# Patient Record
Sex: MALE | Race: WHITE | NOT HISPANIC OR LATINO | Employment: OTHER | URBAN - METROPOLITAN AREA
[De-identification: names, ages, dates, MRNs, and addresses within clinical notes are randomized per-mention and may not be internally consistent; named-entity substitution may affect disease eponyms.]

---

## 2023-03-29 LAB
CALCIDIOL (25 OH VITAMIN D3) (NG/ML) IN SER/PLAS: 43 NG/ML
CHOLESTEROL (MG/DL) IN SER/PLAS: 152 MG/DL (ref 0–199)
CHOLESTEROL IN HDL (MG/DL) IN SER/PLAS: 47.8 MG/DL
CHOLESTEROL/HDL RATIO: 3.2
ESTIMATED AVERAGE GLUCOSE FOR HBA1C: 143 MG/DL
HEMOGLOBIN A1C/HEMOGLOBIN TOTAL IN BLOOD: 6.6 %
LDL: 77 MG/DL (ref 0–99)
TRIGLYCERIDE (MG/DL) IN SER/PLAS: 138 MG/DL (ref 0–149)
VLDL: 28 MG/DL (ref 0–40)

## 2023-06-22 ENCOUNTER — OFFICE VISIT (OUTPATIENT)
Dept: PRIMARY CARE | Facility: CLINIC | Age: 73
End: 2023-06-22
Payer: MEDICARE

## 2023-06-22 VITALS
RESPIRATION RATE: 18 BRPM | SYSTOLIC BLOOD PRESSURE: 114 MMHG | BODY MASS INDEX: 34.45 KG/M2 | HEART RATE: 96 BPM | OXYGEN SATURATION: 94 % | DIASTOLIC BLOOD PRESSURE: 79 MMHG | TEMPERATURE: 97 F | WEIGHT: 232.6 LBS | HEIGHT: 69 IN

## 2023-06-22 DIAGNOSIS — R53.83 FATIGUE, UNSPECIFIED TYPE: ICD-10-CM

## 2023-06-22 DIAGNOSIS — Z00.00 HEALTHCARE MAINTENANCE: Primary | ICD-10-CM

## 2023-06-22 DIAGNOSIS — R10.84 GENERALIZED ABDOMINAL PAIN: ICD-10-CM

## 2023-06-22 DIAGNOSIS — M25.561 ACUTE PAIN OF RIGHT KNEE: ICD-10-CM

## 2023-06-22 PROCEDURE — 1170F FXNL STATUS ASSESSED: CPT | Performed by: INTERNAL MEDICINE

## 2023-06-22 PROCEDURE — 1160F RVW MEDS BY RX/DR IN RCRD: CPT | Performed by: INTERNAL MEDICINE

## 2023-06-22 PROCEDURE — G0439 PPPS, SUBSEQ VISIT: HCPCS | Performed by: INTERNAL MEDICINE

## 2023-06-22 PROCEDURE — 1036F TOBACCO NON-USER: CPT | Performed by: INTERNAL MEDICINE

## 2023-06-22 PROCEDURE — 1159F MED LIST DOCD IN RCRD: CPT | Performed by: INTERNAL MEDICINE

## 2023-06-22 RX ORDER — PEN NEEDLE, DIABETIC 31 GX5/16"
NEEDLE, DISPOSABLE MISCELLANEOUS
COMMUNITY
Start: 2023-03-30 | End: 2024-05-02

## 2023-06-22 RX ORDER — ROSUVASTATIN CALCIUM 20 MG/1
20 TABLET, COATED ORAL DAILY
COMMUNITY
Start: 2021-08-31 | End: 2024-03-07

## 2023-06-22 RX ORDER — CLONAZEPAM 2 MG/1
2 TABLET ORAL 2 TIMES DAILY PRN
COMMUNITY

## 2023-06-22 RX ORDER — HYDROGEN PEROXIDE 3 %
20 SOLUTION, NON-ORAL MISCELLANEOUS
COMMUNITY
Start: 2021-08-31

## 2023-06-22 RX ORDER — SUMATRIPTAN 5 MG/1
1 SPRAY NASAL EVERY 2 HOUR PRN
COMMUNITY
Start: 2021-08-31

## 2023-06-22 RX ORDER — METFORMIN HYDROCHLORIDE 1000 MG/1
1000 TABLET ORAL 2 TIMES DAILY
COMMUNITY
Start: 2021-08-31 | End: 2024-03-07

## 2023-06-22 RX ORDER — ALBUTEROL SULFATE 90 UG/1
2 AEROSOL, METERED RESPIRATORY (INHALATION) EVERY 6 HOURS PRN
COMMUNITY
Start: 2021-08-31

## 2023-06-22 RX ORDER — AMITRIPTYLINE HYDROCHLORIDE 25 MG/1
50 TABLET, FILM COATED ORAL NIGHTLY
COMMUNITY

## 2023-06-22 RX ORDER — ACETAMINOPHEN 500 MG
500 TABLET ORAL EVERY 4 HOURS PRN
COMMUNITY
End: 2023-11-15 | Stop reason: ALTCHOICE

## 2023-06-22 RX ORDER — ASPIRIN 325 MG
50000 TABLET, DELAYED RELEASE (ENTERIC COATED) ORAL
COMMUNITY

## 2023-06-22 RX ORDER — PRAMIPEXOLE DIHYDROCHLORIDE 0.25 MG/1
0.25 TABLET ORAL 3 TIMES DAILY
COMMUNITY

## 2023-06-22 RX ORDER — INSULIN GLARGINE 100 [IU]/ML
INJECTION, SOLUTION SUBCUTANEOUS
COMMUNITY
Start: 2022-12-29 | End: 2023-11-15 | Stop reason: ALTCHOICE

## 2023-06-22 RX ORDER — GABAPENTIN 300 MG/1
300 CAPSULE ORAL 2 TIMES DAILY
COMMUNITY
Start: 2021-08-13 | End: 2023-11-15 | Stop reason: ALTCHOICE

## 2023-06-22 RX ORDER — DAPAGLIFLOZIN 10 MG/1
10 TABLET, FILM COATED ORAL
COMMUNITY
End: 2023-11-15 | Stop reason: SDUPTHER

## 2023-06-22 RX ORDER — SOLIFENACIN SUCCINATE 10 MG/1
10 TABLET, FILM COATED ORAL DAILY
COMMUNITY
Start: 2023-04-12 | End: 2023-11-08 | Stop reason: WASHOUT

## 2023-06-22 RX ORDER — AZELASTINE 1 MG/ML
1 SPRAY, METERED NASAL 2 TIMES DAILY
COMMUNITY
Start: 2020-07-28

## 2023-06-22 RX ORDER — SILDENAFIL CITRATE 20 MG/1
20 TABLET ORAL
COMMUNITY
Start: 2019-04-15 | End: 2023-11-15 | Stop reason: ALTCHOICE

## 2023-06-22 RX ORDER — ONDANSETRON 4 MG/1
4 TABLET, ORALLY DISINTEGRATING ORAL EVERY 8 HOURS PRN
COMMUNITY
Start: 2022-05-16 | End: 2024-03-25 | Stop reason: ALTCHOICE

## 2023-06-22 SDOH — ECONOMIC STABILITY: INCOME INSECURITY: IN THE LAST 12 MONTHS, WAS THERE A TIME WHEN YOU WERE NOT ABLE TO PAY THE MORTGAGE OR RENT ON TIME?: NO

## 2023-06-22 SDOH — HEALTH STABILITY: PHYSICAL HEALTH: ON AVERAGE, HOW MANY DAYS PER WEEK DO YOU ENGAGE IN MODERATE TO STRENUOUS EXERCISE (LIKE A BRISK WALK)?: 5 DAYS

## 2023-06-22 SDOH — ECONOMIC STABILITY: TRANSPORTATION INSECURITY
IN THE PAST 12 MONTHS, HAS THE LACK OF TRANSPORTATION KEPT YOU FROM MEDICAL APPOINTMENTS OR FROM GETTING MEDICATIONS?: NO

## 2023-06-22 SDOH — ECONOMIC STABILITY: FOOD INSECURITY: WITHIN THE PAST 12 MONTHS, YOU WORRIED THAT YOUR FOOD WOULD RUN OUT BEFORE YOU GOT MONEY TO BUY MORE.: NEVER TRUE

## 2023-06-22 SDOH — ECONOMIC STABILITY: TRANSPORTATION INSECURITY
IN THE PAST 12 MONTHS, HAS LACK OF TRANSPORTATION KEPT YOU FROM MEETINGS, WORK, OR FROM GETTING THINGS NEEDED FOR DAILY LIVING?: NO

## 2023-06-22 SDOH — ECONOMIC STABILITY: HOUSING INSECURITY
IN THE LAST 12 MONTHS, WAS THERE A TIME WHEN YOU DID NOT HAVE A STEADY PLACE TO SLEEP OR SLEPT IN A SHELTER (INCLUDING NOW)?: NO

## 2023-06-22 SDOH — ECONOMIC STABILITY: FOOD INSECURITY: WITHIN THE PAST 12 MONTHS, THE FOOD YOU BOUGHT JUST DIDN'T LAST AND YOU DIDN'T HAVE MONEY TO GET MORE.: NEVER TRUE

## 2023-06-22 SDOH — HEALTH STABILITY: PHYSICAL HEALTH: ON AVERAGE, HOW MANY MINUTES DO YOU ENGAGE IN EXERCISE AT THIS LEVEL?: 50 MIN

## 2023-06-22 ASSESSMENT — ANXIETY QUESTIONNAIRES
IF YOU CHECKED OFF ANY PROBLEMS ON THIS QUESTIONNAIRE, HOW DIFFICULT HAVE THESE PROBLEMS MADE IT FOR YOU TO DO YOUR WORK, TAKE CARE OF THINGS AT HOME, OR GET ALONG WITH OTHER PEOPLE: NOT DIFFICULT AT ALL
1. FEELING NERVOUS, ANXIOUS, OR ON EDGE: NOT AT ALL
5. BEING SO RESTLESS THAT IT IS HARD TO SIT STILL: NOT AT ALL
2. NOT BEING ABLE TO STOP OR CONTROL WORRYING: NOT AT ALL
7. FEELING AFRAID AS IF SOMETHING AWFUL MIGHT HAPPEN: NOT AT ALL
GAD7 TOTAL SCORE: 0
3. WORRYING TOO MUCH ABOUT DIFFERENT THINGS: NOT AT ALL
4. TROUBLE RELAXING: NOT AT ALL
6. BECOMING EASILY ANNOYED OR IRRITABLE: NOT AT ALL

## 2023-06-22 ASSESSMENT — SOCIAL DETERMINANTS OF HEALTH (SDOH)
WITHIN THE LAST YEAR, HAVE YOU BEEN KICKED, HIT, SLAPPED, OR OTHERWISE PHYSICALLY HURT BY YOUR PARTNER OR EX-PARTNER?: NO
WITHIN THE LAST YEAR, HAVE TO BEEN RAPED OR FORCED TO HAVE ANY KIND OF SEXUAL ACTIVITY BY YOUR PARTNER OR EX-PARTNER?: NO
HOW HARD IS IT FOR YOU TO PAY FOR THE VERY BASICS LIKE FOOD, HOUSING, MEDICAL CARE, AND HEATING?: NOT VERY HARD
HOW OFTEN DO YOU ATTENT MEETINGS OF THE CLUB OR ORGANIZATION YOU BELONG TO?: PATIENT DECLINED
HOW OFTEN DO YOU GET TOGETHER WITH FRIENDS OR RELATIVES?: THREE TIMES A WEEK
WITHIN THE LAST YEAR, HAVE YOU BEEN HUMILIATED OR EMOTIONALLY ABUSED IN OTHER WAYS BY YOUR PARTNER OR EX-PARTNER?: NO
WITHIN THE LAST YEAR, HAVE YOU BEEN AFRAID OF YOUR PARTNER OR EX-PARTNER?: NO
DO YOU BELONG TO ANY CLUBS OR ORGANIZATIONS SUCH AS CHURCH GROUPS UNIONS, FRATERNAL OR ATHLETIC GROUPS, OR SCHOOL GROUPS?: PATIENT DECLINED
ARE YOU MARRIED, WIDOWED, DIVORCED, SEPARATED, NEVER MARRIED, OR LIVING WITH A PARTNER?: PATIENT DECLINED
IN THE PAST 12 MONTHS, HAS THE ELECTRIC, GAS, OIL, OR WATER COMPANY THREATENED TO SHUT OFF SERVICE IN YOUR HOME?: NO
HOW OFTEN DO YOU ATTEND CHURCH OR RELIGIOUS SERVICES?: MORE THAN 4 TIMES PER YEAR
IN A TYPICAL WEEK, HOW MANY TIMES DO YOU TALK ON THE PHONE WITH FAMILY, FRIENDS, OR NEIGHBORS?: THREE TIMES A WEEK

## 2023-06-22 ASSESSMENT — COLUMBIA-SUICIDE SEVERITY RATING SCALE - C-SSRS
6. HAVE YOU EVER DONE ANYTHING, STARTED TO DO ANYTHING, OR PREPARED TO DO ANYTHING TO END YOUR LIFE?: NO
2. HAVE YOU ACTUALLY HAD ANY THOUGHTS OF KILLING YOURSELF?: NO
1. IN THE PAST MONTH, HAVE YOU WISHED YOU WERE DEAD OR WISHED YOU COULD GO TO SLEEP AND NOT WAKE UP?: NO

## 2023-06-22 ASSESSMENT — ACTIVITIES OF DAILY LIVING (ADL)
JUDGMENT_ADEQUATE_SAFELY_COMPLETE_DAILY_ACTIVITIES: YES
ADEQUATE_TO_COMPLETE_ADL: YES
JUDGMENT_ADEQUATE_SAFELY_COMPLETE_DAILY_ACTIVITIES: YES
GROCERY SHOPPING: INDEPENDENT
HEARING - RIGHT EAR: FUNCTIONAL
DRESSING: INDEPENDENT
BATHING: INDEPENDENT
TAKING MEDICATION: INDEPENDENT
STIL DRIVING: YES
FEEDING YOURSELF: INDEPENDENT
USING TELEPHONE: INDEPENDENT
WALKS IN HOME: INDEPENDENT
DRESSING YOURSELF: INDEPENDENT
BATHING: INDEPENDENT
EATING: INDEPENDENT
ADEQUATE_TO_COMPLETE_ADL: YES
GROOMING: INDEPENDENT
PILL BOX USED: NO
HEARING - LEFT EAR: FUNCTIONAL
DOING HOUSEWORK: INDEPENDENT
PREPARING MEALS: INDEPENDENT
TOILETING: INDEPENDENT
USING TRANSPORTATION: INDEPENDENT
NEEDS ASSISTANCE WITH FOOD: INDEPENDENT
PATIENT'S MEMORY ADEQUATE TO SAFELY COMPLETE DAILY ACTIVITIES?: YES
TOILETING: INDEPENDENT
FEEDING: INDEPENDENT
MANAGING FINANCES: INDEPENDENT

## 2023-06-22 ASSESSMENT — ENCOUNTER SYMPTOMS
MYALGIAS: 0
DIARRHEA: 0
CHILLS: 0
SORE THROAT: 0
LOSS OF SENSATION IN FEET: 0
SINUS PRESSURE: 0
NAUSEA: 0
OCCASIONAL FEELINGS OF UNSTEADINESS: 0
DEPRESSION: 0
ACTIVITY CHANGE: 0
AGITATION: 0
WEAKNESS: 0
CHEST TIGHTNESS: 0
PALPITATIONS: 0
SHORTNESS OF BREATH: 0

## 2023-06-22 ASSESSMENT — LIFESTYLE VARIABLES
HOW MANY STANDARD DRINKS CONTAINING ALCOHOL DO YOU HAVE ON A TYPICAL DAY: 1 OR 2
SKIP TO QUESTIONS 9-10: 1
AUDIT-C TOTAL SCORE: 1
HOW OFTEN DO YOU HAVE SIX OR MORE DRINKS ON ONE OCCASION: NEVER
HOW OFTEN DO YOU HAVE A DRINK CONTAINING ALCOHOL: MONTHLY OR LESS

## 2023-06-22 ASSESSMENT — PATIENT HEALTH QUESTIONNAIRE - PHQ9
2. FEELING DOWN, DEPRESSED OR HOPELESS: NOT AT ALL
1. LITTLE INTEREST OR PLEASURE IN DOING THINGS: NOT AT ALL
SUM OF ALL RESPONSES TO PHQ9 QUESTIONS 1 & 2: 0

## 2023-06-22 ASSESSMENT — PAIN SCALES - GENERAL: PAINLEVEL: 0-NO PAIN

## 2023-06-22 NOTE — PROGRESS NOTES
"Subjective   Patient ID: Frandy Nick is a 73 y.o. male who presents for Diabetes and Medicare Annual Wellness Visit Subsequent.  He feels a little lack of energy.  Laying around a lot more.  He has had no issues with hypoglycemia.    Diabetes  Pertinent negatives for diabetes include no chest pain and no weakness.        Review of Systems   Constitutional:  Negative for activity change and chills.   HENT:  Negative for congestion, sinus pressure and sore throat.    Respiratory:  Negative for chest tightness and shortness of breath.    Cardiovascular:  Negative for chest pain and palpitations.   Gastrointestinal:  Negative for diarrhea and nausea.   Musculoskeletal:  Negative for myalgias.   Neurological:  Negative for weakness.   Psychiatric/Behavioral:  Negative for agitation and behavioral problems.        Objective   /79 (BP Location: Left arm, Patient Position: Sitting, BP Cuff Size: Large adult)   Pulse 96   Temp 36.1 °C (97 °F) (Temporal)   Resp 18   Ht 1.753 m (5' 9\")   Wt 106 kg (232 lb 9.6 oz)   SpO2 94%   BMI 34.35 kg/m²     Physical Exam  Constitutional:       Appearance: Normal appearance.   HENT:      Head: Normocephalic and atraumatic.      Nose: Nose normal.      Mouth/Throat:      Mouth: Mucous membranes are moist.   Eyes:      Extraocular Movements: Extraocular movements intact.      Pupils: Pupils are equal, round, and reactive to light.   Cardiovascular:      Rate and Rhythm: Normal rate and regular rhythm.   Pulmonary:      Effort: No respiratory distress.      Breath sounds: No wheezing.   Abdominal:      General: Bowel sounds are normal.      Palpations: Abdomen is soft.   Neurological:      General: No focal deficit present.         Assessment/Plan   Problem List Items Addressed This Visit       Healthcare maintenance - Primary    Fatigue    Relevant Orders    Comprehensive Metabolic Panel    CBC    Generalized abdominal pain    Relevant Orders    Amylase    Lipase    Acute pain " of right knee    Relevant Orders    Referral to Orthopaedic Surgery   This low-grade abdominal pain seems to be getting better but we will check a amylase and lipase to make sure is not related to a recurrent pancreatitis.  We will encourage the patient to start walking as a way to improve his energy.

## 2023-06-23 LAB
ALANINE AMINOTRANSFERASE (SGPT) (U/L) IN SER/PLAS: 14 U/L (ref 10–52)
ALBUMIN (G/DL) IN SER/PLAS: 4.4 G/DL (ref 3.4–5)
ALKALINE PHOSPHATASE (U/L) IN SER/PLAS: 71 U/L (ref 33–136)
AMYLASE (U/L) IN SER/PLAS: 102 U/L (ref 29–103)
ANION GAP IN SER/PLAS: 12 MMOL/L (ref 10–20)
ASPARTATE AMINOTRANSFERASE (SGOT) (U/L) IN SER/PLAS: 13 U/L (ref 9–39)
BILIRUBIN TOTAL (MG/DL) IN SER/PLAS: 0.4 MG/DL (ref 0–1.2)
CALCIUM (MG/DL) IN SER/PLAS: 10.1 MG/DL (ref 8.6–10.6)
CARBON DIOXIDE, TOTAL (MMOL/L) IN SER/PLAS: 29 MMOL/L (ref 21–32)
CHLORIDE (MMOL/L) IN SER/PLAS: 102 MMOL/L (ref 98–107)
CREATININE (MG/DL) IN SER/PLAS: 0.96 MG/DL (ref 0.5–1.3)
ERYTHROCYTE DISTRIBUTION WIDTH (RATIO) BY AUTOMATED COUNT: 15.3 % (ref 11.5–14.5)
ERYTHROCYTE MEAN CORPUSCULAR HEMOGLOBIN CONCENTRATION (G/DL) BY AUTOMATED: 30.8 G/DL (ref 32–36)
ERYTHROCYTE MEAN CORPUSCULAR VOLUME (FL) BY AUTOMATED COUNT: 96 FL (ref 80–100)
ERYTHROCYTES (10*6/UL) IN BLOOD BY AUTOMATED COUNT: 5.21 X10E12/L (ref 4.5–5.9)
GFR MALE: 84 ML/MIN/1.73M2
GLUCOSE (MG/DL) IN SER/PLAS: 96 MG/DL (ref 74–99)
HEMATOCRIT (%) IN BLOOD BY AUTOMATED COUNT: 50 % (ref 41–52)
HEMOGLOBIN (G/DL) IN BLOOD: 15.4 G/DL (ref 13.5–17.5)
LEUKOCYTES (10*3/UL) IN BLOOD BY AUTOMATED COUNT: 8.1 X10E9/L (ref 4.4–11.3)
LIPASE (U/L) IN SER/PLAS: 34 U/L (ref 9–82)
NRBC (PER 100 WBCS) BY AUTOMATED COUNT: 0 /100 WBC (ref 0–0)
PLATELETS (10*3/UL) IN BLOOD AUTOMATED COUNT: 196 X10E9/L (ref 150–450)
POTASSIUM (MMOL/L) IN SER/PLAS: 4.4 MMOL/L (ref 3.5–5.3)
PROTEIN TOTAL: 7 G/DL (ref 6.4–8.2)
SODIUM (MMOL/L) IN SER/PLAS: 139 MMOL/L (ref 136–145)
UREA NITROGEN (MG/DL) IN SER/PLAS: 18 MG/DL (ref 6–23)

## 2023-06-25 PROBLEM — R10.84 GENERALIZED ABDOMINAL PAIN: Status: ACTIVE | Noted: 2023-06-25

## 2023-06-25 PROBLEM — M25.561 ACUTE PAIN OF RIGHT KNEE: Status: ACTIVE | Noted: 2023-06-25

## 2023-06-25 PROBLEM — R53.83 FATIGUE: Status: ACTIVE | Noted: 2023-06-25

## 2023-06-25 PROBLEM — Z00.00 HEALTHCARE MAINTENANCE: Status: ACTIVE | Noted: 2023-06-25

## 2023-06-25 ASSESSMENT — PATIENT HEALTH QUESTIONNAIRE - PHQ9
SUM OF ALL RESPONSES TO PHQ9 QUESTIONS 1 AND 2: 0
1. LITTLE INTEREST OR PLEASURE IN DOING THINGS: NOT AT ALL
2. FEELING DOWN, DEPRESSED OR HOPELESS: NOT AT ALL

## 2023-06-25 ASSESSMENT — ACTIVITIES OF DAILY LIVING (ADL)
BATHING: INDEPENDENT
MANAGING_FINANCES: INDEPENDENT
TAKING_MEDICATION: INDEPENDENT
DRESSING: INDEPENDENT
DOING_HOUSEWORK: INDEPENDENT
GROCERY_SHOPPING: INDEPENDENT

## 2023-06-26 ENCOUNTER — APPOINTMENT (OUTPATIENT)
Dept: PRIMARY CARE | Facility: CLINIC | Age: 73
End: 2023-06-26
Payer: MEDICARE

## 2023-11-06 PROBLEM — E55.9 VITAMIN D DEFICIENCY: Status: ACTIVE | Noted: 2020-03-18

## 2023-11-06 PROBLEM — E11.69 ERECTILE DYSFUNCTION ASSOCIATED WITH TYPE 2 DIABETES MELLITUS (MULTI): Status: ACTIVE | Noted: 2018-10-22

## 2023-11-06 PROBLEM — H25.13 NUCLEAR SENILE CATARACT OF BOTH EYES: Status: ACTIVE | Noted: 2018-01-11

## 2023-11-06 PROBLEM — E11.69 DIABETES MELLITUS TYPE 2 IN OBESE: Status: ACTIVE | Noted: 2023-11-06

## 2023-11-06 PROBLEM — H52.03 HYPERMETROPIA OF BOTH EYES: Status: ACTIVE | Noted: 2023-11-06

## 2023-11-06 PROBLEM — N40.1 BPH WITH OBSTRUCTION/LOWER URINARY TRACT SYMPTOMS: Status: ACTIVE | Noted: 2023-11-06

## 2023-11-06 PROBLEM — H43.822 VITREOMACULAR TRACTION SYNDROME OF LEFT EYE: Status: ACTIVE | Noted: 2023-11-06

## 2023-11-06 PROBLEM — N40.0 BENIGN PROSTATIC HYPERPLASIA WITHOUT LOWER URINARY TRACT SYMPTOMS: Status: ACTIVE | Noted: 2020-06-26

## 2023-11-06 PROBLEM — R39.15 URGENCY OF URINATION: Status: ACTIVE | Noted: 2023-11-06

## 2023-11-06 PROBLEM — L73.9 FOLLICULAR DISORDER, UNSPECIFIED: Status: ACTIVE | Noted: 2023-04-19

## 2023-11-06 PROBLEM — G47.33 OBSTRUCTIVE SLEEP APNEA SYNDROME: Status: ACTIVE | Noted: 2017-11-15

## 2023-11-06 PROBLEM — E78.1 HYPERTRIGLYCERIDEMIA: Status: ACTIVE | Noted: 2018-04-02

## 2023-11-06 PROBLEM — H52.203 ASTIGMATISM, BILATERAL: Status: ACTIVE | Noted: 2023-11-06

## 2023-11-06 PROBLEM — N52.1 ERECTILE DYSFUNCTION ASSOCIATED WITH TYPE 2 DIABETES MELLITUS (MULTI): Status: ACTIVE | Noted: 2018-10-22

## 2023-11-06 PROBLEM — F32.4 MAJOR DEPRESSIVE DISORDER WITH SINGLE EPISODE, IN PARTIAL REMISSION (CMS-HCC): Status: ACTIVE | Noted: 2018-04-02

## 2023-11-06 PROBLEM — N13.8 BPH WITH OBSTRUCTION/LOWER URINARY TRACT SYMPTOMS: Status: ACTIVE | Noted: 2023-11-06

## 2023-11-06 PROBLEM — H25.13 NUCLEAR SCLEROSIS OF BOTH EYES: Status: ACTIVE | Noted: 2023-11-06

## 2023-11-06 PROBLEM — R94.4 ABNORMAL RENAL FUNCTION TEST: Status: ACTIVE | Noted: 2023-11-06

## 2023-11-06 PROBLEM — H52.4 BILATERAL PRESBYOPIA: Status: ACTIVE | Noted: 2023-11-06

## 2023-11-06 PROBLEM — K21.9 GASTROESOPHAGEAL REFLUX DISEASE: Status: ACTIVE | Noted: 2018-07-27

## 2023-11-06 PROBLEM — F41.9 ANXIETY: Status: ACTIVE | Noted: 2023-11-06

## 2023-11-06 PROBLEM — R73.01 IFG (IMPAIRED FASTING GLUCOSE): Status: ACTIVE | Noted: 2023-11-06

## 2023-11-06 PROBLEM — N50.89 SCROTAL MASS: Status: ACTIVE | Noted: 2018-10-22

## 2023-11-06 PROBLEM — K25.9 GASTRIC ULCER: Status: ACTIVE | Noted: 2023-11-06

## 2023-11-06 PROBLEM — E66.9 DIABETES MELLITUS TYPE 2 IN OBESE: Status: ACTIVE | Noted: 2023-11-06

## 2023-11-06 PROBLEM — H25.013 CORTICAL AGE-RELATED CATARACT OF BOTH EYES: Status: ACTIVE | Noted: 2023-11-06

## 2023-11-06 PROBLEM — H35.40 PERIPHERAL RETICULAR DEGENERATION OF BOTH EYES: Status: ACTIVE | Noted: 2023-11-06

## 2023-11-06 PROBLEM — E78.00 HYPERCHOLESTEREMIA: Status: ACTIVE | Noted: 2023-11-06

## 2023-11-06 PROBLEM — G43.909 MIGRAINE: Status: ACTIVE | Noted: 2023-11-06

## 2023-11-06 PROBLEM — E11.65 TYPE 2 DIABETES MELLITUS WITH HYPERGLYCEMIA (MULTI): Status: ACTIVE | Noted: 2023-11-06

## 2023-11-06 PROBLEM — K63.5 POLYP OF COLON: Status: ACTIVE | Noted: 2020-06-26

## 2023-11-06 PROBLEM — H43.813 PVD (POSTERIOR VITREOUS DETACHMENT), BOTH EYES: Status: ACTIVE | Noted: 2023-11-06

## 2023-11-06 RX ORDER — ERGOCALCIFEROL 1.25 MG/1
1 CAPSULE ORAL WEEKLY
COMMUNITY
End: 2023-11-15 | Stop reason: ALTCHOICE

## 2023-11-06 RX ORDER — TADALAFIL 20 MG/1
TABLET ORAL
COMMUNITY
Start: 2023-08-03

## 2023-11-07 ENCOUNTER — OFFICE VISIT (OUTPATIENT)
Dept: ORTHOPEDIC SURGERY | Facility: CLINIC | Age: 73
End: 2023-11-07
Payer: MEDICARE

## 2023-11-07 ENCOUNTER — ANCILLARY PROCEDURE (OUTPATIENT)
Dept: RADIOLOGY | Facility: CLINIC | Age: 73
End: 2023-11-07
Payer: MEDICARE

## 2023-11-07 DIAGNOSIS — M17.11 PRIMARY OSTEOARTHRITIS OF RIGHT KNEE: Primary | ICD-10-CM

## 2023-11-07 DIAGNOSIS — M25.561 CHRONIC PAIN OF RIGHT KNEE: ICD-10-CM

## 2023-11-07 DIAGNOSIS — G89.29 CHRONIC PAIN OF RIGHT KNEE: ICD-10-CM

## 2023-11-07 PROCEDURE — 1126F AMNT PAIN NOTED NONE PRSNT: CPT | Performed by: STUDENT IN AN ORGANIZED HEALTH CARE EDUCATION/TRAINING PROGRAM

## 2023-11-07 PROCEDURE — 99204 OFFICE O/P NEW MOD 45 MIN: CPT | Performed by: STUDENT IN AN ORGANIZED HEALTH CARE EDUCATION/TRAINING PROGRAM

## 2023-11-07 PROCEDURE — 99214 OFFICE O/P EST MOD 30 MIN: CPT | Performed by: STUDENT IN AN ORGANIZED HEALTH CARE EDUCATION/TRAINING PROGRAM

## 2023-11-07 PROCEDURE — 73564 X-RAY EXAM KNEE 4 OR MORE: CPT | Mod: RIGHT SIDE | Performed by: RADIOLOGY

## 2023-11-07 PROCEDURE — 1159F MED LIST DOCD IN RCRD: CPT | Performed by: STUDENT IN AN ORGANIZED HEALTH CARE EDUCATION/TRAINING PROGRAM

## 2023-11-07 PROCEDURE — 73564 X-RAY EXAM KNEE 4 OR MORE: CPT | Mod: RT

## 2023-11-07 NOTE — PROGRESS NOTES
Frandy Nick is a pleasant 73 y.o. year-old male who is seen today for evaluation of right knee pain. The pain has been present for many years. 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 The onset of pain was not associated with a traumatic injury.  Frandy Nick states that the pain is located in the anterolateral aspect of the knee and was worse in the summer but is not present today.  The patient denies any history of inflammatory arthritis.  The pain is aggravated by prolonged activity and alleviated by rest, ibuprofen.     The patient has tried activity modification and over the counter medications without sufficient relief of symptoms. The does not patient require the use of an assistive device. During this time the patient has not had a significant reduction in their quality of life and activities of daily living.  The pain does not wake the patient from sleep. The patient does report feeling unstable on uneven surfaces.  Frandy Nick denies any hip or groin pain.  Frandy Nick denies any numbness or tingling of the right lower extremity.     He has had steroid injections for his shoulder before but not for his knee. He is interested but would like to defer until closer to his scheduled [place] vacation in January.     The patient denies any fever, chills, chest pain, shortness of breath or difficulty breathing.  Patient denies any numbness, tingling, or radicular symptoms.  Adult patient history sheet was filled out by the patient today in clinic and will be scanned into the EMR.  I personally reviewed this form which will be scanned into the EMR.  This includes Past Medical History, Past Surgical History, Medications, Allergies, Social History, Family History and 12 point review of systems.    General: Well-appearing male in no  acute distress.  Awake, alert and oriented.  Pleasant and cooperative.  Respiratory: Non-labored breathing  Mood: Euthymic   Gait: minimally antalgic  Assistive Device: None     Affected Right Knee  Limb Alignment: neutral  ROM: 0-120  Stable to varus and valgus stress at full extension and 30 degrees of flexion  Skin: Intact, no abrasions or draining sinuses  Effusion: None  Quad Strength: 5/5  Hamstring Strength: 5/5  Patella Crepitus: None  Patella Grind: absent  Tenderness: present with anteromedial palpation  Sensation: Intact to light touch distally  Motor function: Able to fire TA, EHL, G/S  Pulses: Palpable DP pulse  Negative Thessally test    Unaffected Left Knee  Skin: Intact  ROM: 0-120  Effusion: None  No tenderness to palpation on exam    Imaging:   AP / lateral/ mid-flexion/sunrise views: Independent review of right knee x-rays was performed. The findings were reviewed with the patient. There are moderate degenerative changes of the right knee with neutral  limb alignment. There is joint space narrowing, subchondral sclerosis, and osteophyte formation. No evidence of fracture, AVN, dislocation, osteomyelitis.      Assessment:  Moderate-severe right knee osteoarthritis    Plan:  Given his pain has improved since summer and does not significantly preclude him from ADLs, he is not a candidate for surgery at this time. We discussed other conservative measures including corticosteroid injections. He would prefer to defer until closer to his vacation in January. Of note, given his history of left knee meniscus issue and his primary complaint of anterolateral pain, it is possible some of his pain may be related to his meniscus. However, he is tender to palpation anteromedially with negative provocative testing.    Return for right knee CSI in mid-December.  If this does not provide long term pain relief, we may consider obtaining a knee MRI to evaluate for other structural pathology.  If any new  changes/concerns prior, he can contact us.    The patient was seen and examined with my resident Dr. Gutierrez who assisted with documentation.  I independently interviewed the patient to obtain a history and performed physical examination.  I formulated the plan of care.    *This note was created using voice recognition software and was not corrected for typographical or grammatical errors.*

## 2023-11-08 ENCOUNTER — OFFICE VISIT (OUTPATIENT)
Dept: DERMATOLOGY | Facility: CLINIC | Age: 73
End: 2023-11-08
Payer: MEDICARE

## 2023-11-08 DIAGNOSIS — L73.9 FOLLICULITIS: ICD-10-CM

## 2023-11-08 DIAGNOSIS — B07.9 VIRAL WARTS, UNSPECIFIED TYPE: ICD-10-CM

## 2023-11-08 DIAGNOSIS — L57.0 ACTINIC KERATOSIS: Primary | ICD-10-CM

## 2023-11-08 PROCEDURE — 17110 DESTRUCTION B9 LES UP TO 14: CPT | Performed by: STUDENT IN AN ORGANIZED HEALTH CARE EDUCATION/TRAINING PROGRAM

## 2023-11-08 PROCEDURE — 3078F DIAST BP <80 MM HG: CPT | Performed by: DERMATOLOGY

## 2023-11-08 PROCEDURE — 17000 DESTRUCT PREMALG LESION: CPT | Performed by: STUDENT IN AN ORGANIZED HEALTH CARE EDUCATION/TRAINING PROGRAM

## 2023-11-08 PROCEDURE — 1126F AMNT PAIN NOTED NONE PRSNT: CPT | Performed by: DERMATOLOGY

## 2023-11-08 PROCEDURE — 1160F RVW MEDS BY RX/DR IN RCRD: CPT | Performed by: DERMATOLOGY

## 2023-11-08 PROCEDURE — 3044F HG A1C LEVEL LT 7.0%: CPT | Performed by: DERMATOLOGY

## 2023-11-08 PROCEDURE — 3074F SYST BP LT 130 MM HG: CPT | Performed by: DERMATOLOGY

## 2023-11-08 PROCEDURE — 99213 OFFICE O/P EST LOW 20 MIN: CPT | Performed by: DERMATOLOGY

## 2023-11-08 PROCEDURE — 1159F MED LIST DOCD IN RCRD: CPT | Performed by: DERMATOLOGY

## 2023-11-08 RX ORDER — KETOCONAZOLE 20 MG/G
CREAM TOPICAL DAILY
COMMUNITY
End: 2024-03-25 | Stop reason: ALTCHOICE

## 2023-11-08 RX ORDER — CLINDAMYCIN PHOSPHATE 10 UG/ML
LOTION TOPICAL
COMMUNITY
Start: 2023-07-20 | End: 2024-03-25 | Stop reason: ALTCHOICE

## 2023-11-08 ASSESSMENT — ITCH NUMERIC RATING SCALE: HOW SEVERE IS YOUR ITCHING?: 3

## 2023-11-08 NOTE — PROGRESS NOTES
Subjective     Frandy Nick is a 73 y.o. male who presents for the following: folliculitis (Using clindamycin lotion and ketoconazole. ), Rash, and Suspicious Skin Lesion (On abdomen. Itchy.).     Review of Systems:  No other skin or systemic complaints other than what is documented elsewhere in the note.    The following portions of the chart were reviewed this encounter and updated as appropriate:   Tobacco  Allergies  Meds  Problems  Med Hx  Surg Hx         Skin Cancer History  No skin cancer on file.      Specialty Problems          Dermatology Problems    Follicular disorder, unspecified        Objective   Well appearing patient in no apparent distress; mood and affect are within normal limits.    A focused skin examination was performed. All findings within normal limits unless otherwise noted below.    Assessment/Plan   1. Actinic keratosis  Left Forearm - Posterior  Erythematous macules with gritty scale.    Destr of lesion - Left Forearm - Posterior  Complexity: simple    Destruction method: cryotherapy    Informed consent: discussed and consent obtained    Lesion destroyed using liquid nitrogen: Yes    Cryotherapy cycles:  1  Outcome: patient tolerated procedure well with no complications    Post-procedure details: wound care instructions given      2. Viral warts, unspecified type  Right Abdomen (side) - Lower  Verrucous papule    Destr of lesion - Right Abdomen (side) - Lower  Complexity: simple    Destruction method: cryotherapy    Informed consent: discussed and consent obtained    Lesion destroyed using liquid nitrogen: Yes    Cryotherapy cycles:  2  Outcome: patient tolerated procedure well with no complications    Post-procedure details: wound care instructions given      3. Folliculitis  Head - Anterior (Face)  Clear today    Patient had localized area of folliculitis on right pre-auricular cheek. Improved with clindamycin lotion and ketoconazole cream.  PLAN:  Continue alternating once daily  application of clindamycin 1% lotion and ketoconazole 2% cream     Related Procedures  Follow Up In Dermatology

## 2023-11-10 NOTE — PROGRESS NOTES
"FUV for diabetes. LV with me 09/2023.     History of Present Illness  73 y.o. male with hx of type 2 diabetes, obesity class I, HLD.     Dx: in 60s  HbA1c: 6.2% (11/15/2023), 6.3% (7/5/2023), 6.6% (03/2023), 13.2% (12/2022)  Current regimen: metformin 1000 mg BID, Tresiba/Toujeo 15 units QHS, Farxiga 10 mg QDAY  Past medications: Ozempic (pancreatitis), Trulicity, Victoza  Complications: none  Comorbidities: obesity, HLD     SMBG: Lorenzo 2     Hypoglycemia: no    Today:  -tried both Toujeo and Tresiba  -Tresiba by far had the least GI side effects   -will be working part time at the beginning of the year next year    ROS  General: no fever or chills  CV: no chest pain   Respiratory: no shortness of breath  MSK: no lower extremity edema  Neuro: no headache or dizziness  See HPI for Endocrine ROS    Physical Exam   height is 1.753 m (5' 9\") and weight is 106 kg (234 lb). His blood pressure is 109/74 and his pulse is 88.   General: not in acute distress  HEENT: MOISES HOGUE  Thyroid: no goiter  Neuro: alert and oriented x 3    Current Outpatient Medications   Medication Sig Dispense Refill    albuterol 90 mcg/actuation inhaler Inhale 2 puffs every 6 hours if needed.      amitriptyline (Elavil) 25 mg tablet Take 2 tablets (50 mg) by mouth once daily at bedtime.      azelastine (Astelin) 137 mcg (0.1 %) nasal spray Administer 1 spray into affected nostril(s) twice a day.      BD Ultra-Fine Mini Pen Needle 31 gauge x 3/16\" needle USE WITH INSULIN NIGHTLY      cholecalciferol (Vitamin D-3) 1,250 mcg (50,000 unit) capsule Take 1 capsule (50,000 Units) by mouth every 14 (fourteen) days.      clindamycin (Cleocin T) 1 % lotion 1 APPLICATION DAILY TOPICALLY APPLY TO AFFECTED AREAS ON FACE ONCE DAILY      clonazePAM (KlonoPIN) 2 mg tablet Take 1 tablet (2 mg) by mouth 2 times a day as needed.      esomeprazole (NexIUM) 20 mg DR capsule Take 1 capsule (20 mg) by mouth once daily in the morning. Take before meals.      FreeStyle " Lorenzo reader (FreeStyle Lorenzo 2 [place]) misc Inject 1 each under the skin if needed. Use as instructed      ketoconazole (NIZOral) 2 % cream Apply topically once daily.      metFORMIN (Glucophage) 1,000 mg tablet Take 1 tablet (1,000 mg) by mouth 2 times a day.      ondansetron ODT (Zofran-ODT) 4 mg disintegrating tablet Take 1 tablet (4 mg) by mouth every 8 hours if needed.      pramipexole (Mirapex) 0.25 mg tablet Take 1 tablet (0.25 mg) by mouth 3 times a day.      rosuvastatin (Crestor) 20 mg tablet Take 1 tablet (20 mg) by mouth once daily.      SUMAtriptan (Imitrex) 5 mg/actuation nasal spray Administer 1 spray (5 mg) into affected nostril(s) every 2 hours if needed.      tadalafil 20 mg tablet Take by mouth.      Farxiga 10 mg Take 1 tablet (10 mg) by mouth once daily in the morning. Take before meals. 90 tablet 1    insulin degludec (Tresiba FlexTouch U-100) 100 unit/mL (3 mL) injection Inject 15 units at bedtime. 15 mL 1     No current facility-administered medications for this visit.     <CGM>  Average blood glucose: 129 mg/dl.    0 % of time worn spend below 70mg/dL.    98 % of time worn spent at target 70-180mg/dL.    2  % of time worn spent above 180mg/dL.   CGM data was used to influence glucose control treatment plan.    Minimum of 72 hours of data were reviewed.     Assessment and Plan  74 yo man with hx of type 2 diabetes, obesity class I, HLD, here for management of diabetes and medication side effects.     1. Type 2 diabetes mellitus  2. Obesity class I  HbA1c: 6.2% (11/15/2023), 6.3% (7/5/2023), 6.6% (3/29/2023), 13.2% (12/2022), 6.7% (07/2022), 6.3% (08/2021)  Current regimen: metformin 1g BID, Tresiba 15 units QHS, Farxiga 10 mg QDAY  Eye exam: no DR (01/2023)  Urine microalbumin: 10 mg/g (07/2022)  Lipids: HDL 48, LDL 77,  (03/2023) on rosuvastatin 20 mg  *Avoid GLP-1RA-pancreatitis on Ozempic     A1c: 6.2%!  Lorenzo download reviewed.  TIR 98%.  Stable blood glucose.    He reported  fatigue, nausea, bloating since starting Basaglar. Symptoms improved with dose reduction.  Provided samples of Toujeo and Tresiba at  to try.  Reports that Tresiba has caused the least degree of side effects by far. Has been taking Tresiba for the past week. He will give it a little more time to see if GI symptoms continue to improve. Will prescribe Tresiba.    Chronic GI issues with bloating, diarrhea, abdominal tenderness for years.  Had these symptoms prior to metformin but will try decreasing dose as metformin may be exacerbating the GI symptoms.  If this does not help, will consider trying ER formulation.      PLAN:  -continue Tresiba 15 units QHS  -continue Farxiga 10 mg QDAY  -decrease metformin to 500 mg BID  -check blood sugars twice a day (before breakfast and dinner)  -Lorenzo Huang  -eye exam scheduled for 01/2024  -check HbA1c, lipids, urine microalbumin before next visit    Follow-up in 4 months (labs prior)  Uses MyChart.

## 2023-11-15 ENCOUNTER — OFFICE VISIT (OUTPATIENT)
Dept: ENDOCRINOLOGY | Facility: CLINIC | Age: 73
End: 2023-11-15
Payer: MEDICARE

## 2023-11-15 VITALS
WEIGHT: 234 LBS | HEIGHT: 69 IN | HEART RATE: 88 BPM | SYSTOLIC BLOOD PRESSURE: 109 MMHG | DIASTOLIC BLOOD PRESSURE: 74 MMHG | BODY MASS INDEX: 34.66 KG/M2

## 2023-11-15 DIAGNOSIS — E11.65 TYPE 2 DIABETES MELLITUS WITH HYPERGLYCEMIA, WITH LONG-TERM CURRENT USE OF INSULIN (MULTI): Primary | ICD-10-CM

## 2023-11-15 DIAGNOSIS — Z79.4 TYPE 2 DIABETES MELLITUS WITH HYPERGLYCEMIA, WITH LONG-TERM CURRENT USE OF INSULIN (MULTI): Primary | ICD-10-CM

## 2023-11-15 LAB — POC HEMOGLOBIN A1C: 6.2 % (ref 4.2–6.5)

## 2023-11-15 PROCEDURE — 99214 OFFICE O/P EST MOD 30 MIN: CPT | Performed by: STUDENT IN AN ORGANIZED HEALTH CARE EDUCATION/TRAINING PROGRAM

## 2023-11-15 PROCEDURE — 1160F RVW MEDS BY RX/DR IN RCRD: CPT | Performed by: STUDENT IN AN ORGANIZED HEALTH CARE EDUCATION/TRAINING PROGRAM

## 2023-11-15 PROCEDURE — 95251 CONT GLUC MNTR ANALYSIS I&R: CPT | Performed by: STUDENT IN AN ORGANIZED HEALTH CARE EDUCATION/TRAINING PROGRAM

## 2023-11-15 PROCEDURE — 3078F DIAST BP <80 MM HG: CPT | Performed by: STUDENT IN AN ORGANIZED HEALTH CARE EDUCATION/TRAINING PROGRAM

## 2023-11-15 PROCEDURE — 3044F HG A1C LEVEL LT 7.0%: CPT | Performed by: STUDENT IN AN ORGANIZED HEALTH CARE EDUCATION/TRAINING PROGRAM

## 2023-11-15 PROCEDURE — 1126F AMNT PAIN NOTED NONE PRSNT: CPT | Performed by: STUDENT IN AN ORGANIZED HEALTH CARE EDUCATION/TRAINING PROGRAM

## 2023-11-15 PROCEDURE — 3074F SYST BP LT 130 MM HG: CPT | Performed by: STUDENT IN AN ORGANIZED HEALTH CARE EDUCATION/TRAINING PROGRAM

## 2023-11-15 PROCEDURE — 83036 HEMOGLOBIN GLYCOSYLATED A1C: CPT | Performed by: STUDENT IN AN ORGANIZED HEALTH CARE EDUCATION/TRAINING PROGRAM

## 2023-11-15 PROCEDURE — 1159F MED LIST DOCD IN RCRD: CPT | Performed by: STUDENT IN AN ORGANIZED HEALTH CARE EDUCATION/TRAINING PROGRAM

## 2023-11-15 RX ORDER — INSULIN DEGLUDEC 100 U/ML
15 INJECTION, SOLUTION SUBCUTANEOUS NIGHTLY
COMMUNITY
End: 2023-11-15 | Stop reason: SDUPTHER

## 2023-11-15 RX ORDER — INSULIN DEGLUDEC 100 U/ML
INJECTION, SOLUTION SUBCUTANEOUS
Qty: 15 ML | Refills: 1 | Status: SHIPPED | OUTPATIENT
Start: 2023-11-15 | End: 2024-05-16

## 2023-11-15 RX ORDER — FLASH GLUCOSE SCANNING READER
1 EACH MISCELLANEOUS AS NEEDED
COMMUNITY

## 2023-11-15 RX ORDER — DAPAGLIFLOZIN 10 MG/1
10 TABLET, FILM COATED ORAL
Qty: 90 TABLET | Refills: 1 | Status: SHIPPED | OUTPATIENT
Start: 2023-11-15

## 2023-11-15 NOTE — PATIENT INSTRUCTIONS
Continue Tresiba 15 units at bedtime  Continue Farxiga  Decrease metformin to 500 mg (1/2 tablet) twice a day     Please have blood and urine tests done a few days before your next appointment with me

## 2023-12-13 ENCOUNTER — TELEPHONE (OUTPATIENT)
Dept: ENDOCRINOLOGY | Facility: CLINIC | Age: 73
End: 2023-12-13
Payer: MEDICARE

## 2023-12-13 NOTE — TELEPHONE ENCOUNTER
[facility] requested most recent office note on 12/11/23  Note from 11/15/23 faxed/emailed to [phone] on 12/13    Form forwarded to Dr. Durham via email to be signed.

## 2024-01-04 ENCOUNTER — TELEPHONE (OUTPATIENT)
Dept: DERMATOLOGY | Facility: CLINIC | Age: 74
End: 2024-01-04
Payer: MEDICARE

## 2024-01-04 NOTE — TELEPHONE ENCOUNTER
left patient a message with date and time of reschedule appt from May 15 to May 1 advised patient to call if unable to keep appt date and time.

## 2024-03-04 ENCOUNTER — APPOINTMENT (OUTPATIENT)
Dept: NEUROLOGY | Facility: CLINIC | Age: 74
End: 2024-03-04
Payer: MEDICARE

## 2024-03-07 DIAGNOSIS — E11.65 TYPE 2 DIABETES MELLITUS WITH HYPERGLYCEMIA, WITH LONG-TERM CURRENT USE OF INSULIN (MULTI): Primary | ICD-10-CM

## 2024-03-07 DIAGNOSIS — Z79.4 TYPE 2 DIABETES MELLITUS WITH HYPERGLYCEMIA, WITH LONG-TERM CURRENT USE OF INSULIN (MULTI): Primary | ICD-10-CM

## 2024-03-07 RX ORDER — METFORMIN HYDROCHLORIDE 1000 MG/1
1000 TABLET ORAL 2 TIMES DAILY
Qty: 180 TABLET | Refills: 3 | Status: SHIPPED | OUTPATIENT
Start: 2024-03-07

## 2024-03-07 RX ORDER — ROSUVASTATIN CALCIUM 20 MG/1
20 TABLET, COATED ORAL DAILY
Qty: 90 TABLET | Refills: 3 | Status: SHIPPED | OUTPATIENT
Start: 2024-03-07

## 2024-03-14 ENCOUNTER — OFFICE VISIT (OUTPATIENT)
Dept: OPHTHALMOLOGY | Facility: CLINIC | Age: 74
End: 2024-03-14
Payer: MEDICARE

## 2024-03-14 DIAGNOSIS — H52.203 ASTIGMATISM OF BOTH EYES WITH PRESBYOPIA: ICD-10-CM

## 2024-03-14 DIAGNOSIS — H52.4 ASTIGMATISM OF BOTH EYES WITH PRESBYOPIA: ICD-10-CM

## 2024-03-14 DIAGNOSIS — H25.13 NUCLEAR SCLEROSIS OF BOTH EYES: ICD-10-CM

## 2024-03-14 DIAGNOSIS — H43.811 PVD (POSTERIOR VITREOUS DETACHMENT), RIGHT EYE: ICD-10-CM

## 2024-03-14 DIAGNOSIS — H43.822 VITREOMACULAR TRACTION SYNDROME OF LEFT EYE: Primary | ICD-10-CM

## 2024-03-14 PROCEDURE — 92014 COMPRE OPH EXAM EST PT 1/>: CPT | Performed by: OPTOMETRIST

## 2024-03-14 PROCEDURE — 92134 CPTRZ OPH DX IMG PST SGM RTA: CPT | Performed by: OPTOMETRIST

## 2024-03-14 PROCEDURE — 92015 DETERMINE REFRACTIVE STATE: CPT | Performed by: OPTOMETRIST

## 2024-03-14 ASSESSMENT — EXTERNAL EXAM - RIGHT EYE: OD_EXAM: NORMAL

## 2024-03-14 ASSESSMENT — CONF VISUAL FIELD
OD_NORMAL: 1
OS_SUPERIOR_TEMPORAL_RESTRICTION: 0
OS_INFERIOR_TEMPORAL_RESTRICTION: 0
OD_INFERIOR_TEMPORAL_RESTRICTION: 0
OD_INFERIOR_NASAL_RESTRICTION: 0
OS_NORMAL: 1
OD_SUPERIOR_TEMPORAL_RESTRICTION: 0
OS_INFERIOR_NASAL_RESTRICTION: 0
OD_SUPERIOR_NASAL_RESTRICTION: 0
OS_SUPERIOR_NASAL_RESTRICTION: 0

## 2024-03-14 ASSESSMENT — REFRACTION_WEARINGRX
OD_AXIS: 053
OD_ADD: +2.50
OS_ADD: +2.50
OS_CYLINDER: -0.50
OS_AXIS: 105
OS_SPHERE: +2.00
OD_CYLINDER: -1.25
OD_SPHERE: +1.75
SPECS_TYPE: PAL

## 2024-03-14 ASSESSMENT — ENCOUNTER SYMPTOMS
ENDOCRINE NEGATIVE: 0
ALLERGIC/IMMUNOLOGIC NEGATIVE: 0
MUSCULOSKELETAL NEGATIVE: 0
CONSTITUTIONAL NEGATIVE: 0
EYES NEGATIVE: 1
CARDIOVASCULAR NEGATIVE: 0
GASTROINTESTINAL NEGATIVE: 0
RESPIRATORY NEGATIVE: 0
NEUROLOGICAL NEGATIVE: 0
PSYCHIATRIC NEGATIVE: 0
HEMATOLOGIC/LYMPHATIC NEGATIVE: 0

## 2024-03-14 ASSESSMENT — REFRACTION_MANIFEST
OS_CYLINDER: -0.75
OS_AXIS: 105
OS_SPHERE: +2.00
OD_ADD: +2.50
OS_ADD: +2.50
OD_CYLINDER: -1.50
OD_AXIS: 055
OD_SPHERE: +1.25

## 2024-03-14 ASSESSMENT — VISUAL ACUITY
METHOD: SNELLEN - LINEAR
OD_CC: 20/30
CORRECTION_TYPE: GLASSES
OS_CC: 20/20
OD_CC+: -2
OS_CC+: -1

## 2024-03-14 ASSESSMENT — TONOMETRY
OS_IOP_MMHG: 14
OD_IOP_MMHG: 14
IOP_METHOD: GOLDMANN APPLANATION

## 2024-03-14 ASSESSMENT — CUP TO DISC RATIO
OS_RATIO: .2
OD_RATIO: .2

## 2024-03-14 ASSESSMENT — SLIT LAMP EXAM - LIDS
COMMENTS: GOOD POSITION
COMMENTS: GOOD POSITION

## 2024-03-14 ASSESSMENT — EXTERNAL EXAM - LEFT EYE: OS_EXAM: NORMAL

## 2024-03-14 NOTE — PROGRESS NOTES
Mild cataracts OS>OD.  VA corrects to 20/20 OD and OS.  Recent onset floaters (x2 months).  PVD OD and no tear hole RD.  Hx of retinal hemorrhage many years ago.  No residual findings.  DMII with poor BS control and no retinopathy.  I discussed the value   of good blood sugar control under your management and annual eye exams.  Peripheral reticular degeneration of the retiona and not a risk factor vision.      Optical coherence tomography of the macula revealed:    OD: Normal foveal contour, photoreceptor, retinal pigment epithelium, IS/OS junction, central field 320 was 316 micron.  Vitreous hyaloid NOT base visualized.  Findings are c/w PVD.   OS:  Slightly flattened foveal contour from mild VMTS affecting  foveal contour, photoreceptor, retinal pigment epithelium, IS/OS junction, central field 314 was 314 micron.  Vitreous hyaloid base visualized.  Findings are c/w mild VMTS.  The patient was asked   to return to our clinic or seek out eye care ASAP if new flashes of light or floaters are noted.     Rx change OD and patient advised of refractive VA and glare impact of cataracts..  A spectacle prescription was dispensed to be used as needed.      RTC 1 year for full exam VA manifest refraction (MR) BAT DFE and macula OCT.

## 2024-03-19 NOTE — PROGRESS NOTES
"FUV for diabetes. LV with me 11/2023.     History of Present Illness  73 y.o. male with hx of type 2 diabetes, obesity class I, HLD.     Dx: in 60s  HbA1c: 6.2% (11/15/2023), 6.3% (7/5/2023), 6.6% (03/2023), 13.2% (12/2022)  Current regimen: metformin 500 mg BID, Tresiba/Toujeo 15 units QHS, Farxiga 10 mg QDAY  Past medications: Ozempic (pancreatitis), Trulicity, Victoza  Complications: none  Comorbidities: obesity, HLD     SMBG: Lorenzo 2     Hypoglycemia: no    Today:  -supposed to only working twice a week but still working more than that but likes his job  -feeling well, tolerating Tresiba without significant GI side effects    ROS  General: no fever or chills  CV: no chest pain   Respiratory: no shortness of breath  MSK: no lower extremity edema  Neuro: no headache or dizziness  See [facility] for Endocrine ROS    Physical Exam   height is 1.753 m (5' 9\") and weight is 109 kg (240 lb). His blood pressure is 127/77 and his pulse is 82.   General: not in acute distress  HEENT: MOISES HOGUE  Thyroid: no goiter  Neuro: alert and oriented x 3    Current Outpatient Medications   Medication Sig Dispense Refill    albuterol 90 mcg/actuation inhaler Inhale 2 puffs every 6 hours if needed.      amitriptyline (Elavil) 25 mg tablet Take 2 tablets (50 mg) by mouth once daily at bedtime.      azelastine (Astelin) 137 mcg (0.1 %) nasal spray Administer 1 spray into affected nostril(s) twice a day.      BD Ultra-Fine Mini Pen Needle 31 gauge x 3/16\" needle USE WITH INSULIN NIGHTLY      cholecalciferol (Vitamin D-3) 1,250 mcg (50,000 unit) capsule Take 1 capsule (50,000 Units) by mouth every 14 (fourteen) days.      clonazePAM (KlonoPIN) 2 mg tablet Take 1 tablet (2 mg) by mouth 2 times a day as needed.      esomeprazole (NexIUM) 20 mg DR capsule Take 1 capsule (20 mg) by mouth once daily in the morning. Take before meals.      Farxiga 10 mg Take 1 tablet (10 mg) by mouth once daily in the morning. Take before meals. 90 tablet 1    " FreeStyle Lorenzo reader (FreeStyle Lorenzo 2 [place]) misc Inject 1 each under the skin if needed. Use as instructed      insulin degludec (Tresiba FlexTouch U-100) 100 unit/mL (3 mL) injection Inject 15 units at bedtime. 15 mL 1    metFORMIN (Glucophage) 1,000 mg tablet TAKE 1 TABLET TWICE A DAY (Patient taking differently: Take 0.5 tablets (500 mg) by mouth 2 times a day.) 180 tablet 3    pramipexole (Mirapex) 0.25 mg tablet Take 1 tablet (0.25 mg) by mouth 3 times a day.      rosuvastatin (Crestor) 20 mg tablet TAKE 1 TABLET BY MOUTH EVERY DAY 90 tablet 3    SUMAtriptan (Imitrex) 5 mg/actuation nasal spray Administer 1 spray (5 mg) into affected nostril(s) every 2 hours if needed.      tadalafil (Cialis) 5 mg tablet Take 1 tablet (5 mg) by mouth once daily.      tadalafil 20 mg tablet Take by mouth.       No current facility-administered medications for this visit.     <CGM>  Average blood glucose: 129 mg/dl.    0 % of time worn spend below 70mg/dL.    98 % of time worn spent at target 70-180mg/dL.    2  % of time worn spent above 180mg/dL.   CGM data was used to influence glucose control treatment plan.    Minimum of 72 hours of data were reviewed.     Assessment and Plan  73 y.o. male with hx of type 2 diabetes, obesity class I, HLD, here for management of diabetes and medication side effects.     1. Type 2 diabetes mellitus  2. Obesity class I  HbA1c: 6.2% (11/15/2023), 6.3% (7/5/2023), 6.6% (3/29/2023), 13.2% (12/2022), 6.7% (07/2022), 6.3% (08/2021)  Current regimen: metformin 500 mg BID, Tresiba 15 units QHS, Farxiga 10 mg QDAY  Eye exam: no DR (03/2024)  Urine microalbumin: 10 mg/g (07/2022)  Lipids: HDL 48, LDL 77,  (03/2023) on rosuvastatin 20 mg  *Avoid GLP-1RA-pancreatitis on Ozempic     A1c: pending  Lorenzo download reviewed.    Occasional post-prandial hyperglycemia to the mid 200s after dinner or breakfast depending on what he eats.  Will increase evening metformin to 1000 mg.    Chronic GI issues  with bloating, diarrhea, abdominal tenderness for years.  He reported worsening fatigue, nausea, bloating since starting Basaglar. Symptoms improved with dose reduction. Provided samples of Toujeo and Tresiba to try.  Reports that Tresiba has caused the least degree of side effects by far and he is tolerating this.    PLAN:  -continue Tresiba 15 units QHS  -continue Farxiga 10 mg QDAY  -increase metformin to 500-100 mg BID  -check blood sugars twice a day (before breakfast and dinner)  -Lorenzo Huang    Follow-up in 5 months   Uses [facility].

## 2024-03-25 ENCOUNTER — LAB (OUTPATIENT)
Dept: LAB | Facility: LAB | Age: 74
End: 2024-03-25
Payer: MEDICARE

## 2024-03-25 ENCOUNTER — OFFICE VISIT (OUTPATIENT)
Dept: ENDOCRINOLOGY | Facility: CLINIC | Age: 74
End: 2024-03-25
Payer: MEDICARE

## 2024-03-25 VITALS
HEART RATE: 82 BPM | BODY MASS INDEX: 35.55 KG/M2 | SYSTOLIC BLOOD PRESSURE: 127 MMHG | HEIGHT: 69 IN | DIASTOLIC BLOOD PRESSURE: 77 MMHG | WEIGHT: 240 LBS

## 2024-03-25 DIAGNOSIS — Z79.4 TYPE 2 DIABETES MELLITUS WITH HYPERGLYCEMIA, WITH LONG-TERM CURRENT USE OF INSULIN (MULTI): ICD-10-CM

## 2024-03-25 DIAGNOSIS — E11.65 TYPE 2 DIABETES MELLITUS WITH HYPERGLYCEMIA, WITH LONG-TERM CURRENT USE OF INSULIN (MULTI): ICD-10-CM

## 2024-03-25 LAB
ALBUMIN SERPL BCP-MCNC: 4.1 G/DL (ref 3.4–5)
ALP SERPL-CCNC: 78 U/L (ref 33–136)
ALT SERPL W P-5'-P-CCNC: 15 U/L (ref 10–52)
ANION GAP SERPL CALC-SCNC: 15 MMOL/L (ref 10–20)
AST SERPL W P-5'-P-CCNC: 12 U/L (ref 9–39)
BILIRUB SERPL-MCNC: 0.4 MG/DL (ref 0–1.2)
BUN SERPL-MCNC: 13 MG/DL (ref 6–23)
CALCIUM SERPL-MCNC: 9.5 MG/DL (ref 8.6–10.6)
CHLORIDE SERPL-SCNC: 103 MMOL/L (ref 98–107)
CHOLEST SERPL-MCNC: 142 MG/DL (ref 0–199)
CHOLESTEROL/HDL RATIO: 3.2
CO2 SERPL-SCNC: 27 MMOL/L (ref 21–32)
CREAT SERPL-MCNC: 1.18 MG/DL (ref 0.5–1.3)
CREAT UR-MCNC: 84.5 MG/DL (ref 20–370)
EGFRCR SERPLBLD CKD-EPI 2021: 66 ML/MIN/1.73M*2
EST. AVERAGE GLUCOSE BLD GHB EST-MCNC: 131 MG/DL
GLUCOSE SERPL-MCNC: 139 MG/DL (ref 74–99)
HBA1C MFR BLD: 6.2 %
HDLC SERPL-MCNC: 44.3 MG/DL
LDLC SERPL CALC-MCNC: 69 MG/DL
MICROALBUMIN UR-MCNC: <7 MG/L
MICROALBUMIN/CREAT UR: NORMAL MG/G{CREAT}
NON HDL CHOLESTEROL: 98 MG/DL (ref 0–149)
POTASSIUM SERPL-SCNC: 4.5 MMOL/L (ref 3.5–5.3)
PROT SERPL-MCNC: 6.7 G/DL (ref 6.4–8.2)
SODIUM SERPL-SCNC: 140 MMOL/L (ref 136–145)
TRIGL SERPL-MCNC: 145 MG/DL (ref 0–149)
VLDL: 29 MG/DL (ref 0–40)

## 2024-03-25 PROCEDURE — 1160F RVW MEDS BY RX/DR IN RCRD: CPT | Performed by: STUDENT IN AN ORGANIZED HEALTH CARE EDUCATION/TRAINING PROGRAM

## 2024-03-25 PROCEDURE — 83036 HEMOGLOBIN GLYCOSYLATED A1C: CPT

## 2024-03-25 PROCEDURE — 99215 OFFICE O/P EST HI 40 MIN: CPT | Performed by: STUDENT IN AN ORGANIZED HEALTH CARE EDUCATION/TRAINING PROGRAM

## 2024-03-25 PROCEDURE — 95251 CONT GLUC MNTR ANALYSIS I&R: CPT | Performed by: STUDENT IN AN ORGANIZED HEALTH CARE EDUCATION/TRAINING PROGRAM

## 2024-03-25 PROCEDURE — 82043 UR ALBUMIN QUANTITATIVE: CPT

## 2024-03-25 PROCEDURE — 3078F DIAST BP <80 MM HG: CPT | Performed by: STUDENT IN AN ORGANIZED HEALTH CARE EDUCATION/TRAINING PROGRAM

## 2024-03-25 PROCEDURE — 36415 COLL VENOUS BLD VENIPUNCTURE: CPT

## 2024-03-25 PROCEDURE — 3074F SYST BP LT 130 MM HG: CPT | Performed by: STUDENT IN AN ORGANIZED HEALTH CARE EDUCATION/TRAINING PROGRAM

## 2024-03-25 PROCEDURE — 1159F MED LIST DOCD IN RCRD: CPT | Performed by: STUDENT IN AN ORGANIZED HEALTH CARE EDUCATION/TRAINING PROGRAM

## 2024-03-25 PROCEDURE — 80061 LIPID PANEL: CPT

## 2024-03-25 PROCEDURE — 82570 ASSAY OF URINE CREATININE: CPT

## 2024-03-25 PROCEDURE — 80053 COMPREHEN METABOLIC PANEL: CPT

## 2024-03-25 RX ORDER — TADALAFIL 5 MG/1
5 TABLET ORAL DAILY
COMMUNITY

## 2024-03-26 ENCOUNTER — TELEPHONE (OUTPATIENT)
Dept: ENDOCRINOLOGY | Facility: CLINIC | Age: 74
End: 2024-03-26
Payer: MEDICARE

## 2024-03-26 NOTE — TELEPHONE ENCOUNTER
[facility]  [facility]    Form completed by me and signed by Dr. Durham  Faxed/emailed to:  [phone] on 3/26    Form on file in email      *form was received on 3/21 but forwarded to me via email on 3/26

## 2024-04-29 ASSESSMENT — DERMATOLOGY QUALITY OF LIFE (QOL) ASSESSMENT
RATE HOW BOTHERED YOU ARE BY SYMPTOMS OF YOUR SKIN PROBLEM (EG, ITCHING, STINGING BURNING, HURTING OR SKIN IRRITATION): 2
RATE HOW BOTHERED YOU ARE BY EFFECTS OF YOUR SKIN PROBLEMS ON YOUR ACTIVITIES (EG, GOING OUT, ACCOMPLISHING WHAT YOU WANT, WORK ACTIVITIES OR YOUR RELATIONSHIPS WITH OTHERS): 0 - NEVER BOTHERED
RATE HOW BOTHERED YOU ARE BY EFFECTS OF YOUR SKIN PROBLEMS ON YOUR ACTIVITIES (EG, GOING OUT, ACCOMPLISHING WHAT YOU WANT, WORK ACTIVITIES OR YOUR RELATIONSHIPS WITH OTHERS): 0 - NEVER BOTHERED
RATE HOW EMOTIONALLY BOTHERED YOU ARE BY YOUR SKIN PROBLEM (FOR EXAMPLE, WORRY, EMBARRASSMENT, FRUSTRATION): 1
RATE HOW BOTHERED YOU ARE BY SYMPTOMS OF YOUR SKIN PROBLEM (EG, ITCHING, STINGING BURNING, HURTING OR SKIN IRRITATION): 2
RATE HOW EMOTIONALLY BOTHERED YOU ARE BY YOUR SKIN PROBLEM (FOR EXAMPLE, WORRY, EMBARRASSMENT, FRUSTRATION): 1

## 2024-04-29 ASSESSMENT — PATIENT GLOBAL ASSESSMENT (PGA): WHAT IS THE PGA: PATIENT GLOBAL ASSESSMENT:  2 - MILD

## 2024-05-01 ENCOUNTER — OFFICE VISIT (OUTPATIENT)
Dept: DERMATOLOGY | Facility: CLINIC | Age: 74
End: 2024-05-01
Payer: MEDICARE

## 2024-05-01 DIAGNOSIS — L73.9 FOLLICULITIS: ICD-10-CM

## 2024-05-01 DIAGNOSIS — L91.8 INFLAMED SKIN TAG: ICD-10-CM

## 2024-05-01 DIAGNOSIS — L82.1 SEBORRHEIC KERATOSES: ICD-10-CM

## 2024-05-01 DIAGNOSIS — D22.9 MULTIPLE BENIGN MELANOCYTIC NEVI: ICD-10-CM

## 2024-05-01 DIAGNOSIS — L57.8 SUN-DAMAGED SKIN: Primary | ICD-10-CM

## 2024-05-01 DIAGNOSIS — D18.01 CHERRY ANGIOMA: ICD-10-CM

## 2024-05-01 PROCEDURE — 1159F MED LIST DOCD IN RCRD: CPT | Performed by: DERMATOLOGY

## 2024-05-01 PROCEDURE — 3044F HG A1C LEVEL LT 7.0%: CPT | Performed by: DERMATOLOGY

## 2024-05-01 PROCEDURE — 11200 RMVL SKIN TAGS UP TO&INC 15: CPT | Performed by: STUDENT IN AN ORGANIZED HEALTH CARE EDUCATION/TRAINING PROGRAM

## 2024-05-01 PROCEDURE — 1160F RVW MEDS BY RX/DR IN RCRD: CPT | Performed by: DERMATOLOGY

## 2024-05-01 PROCEDURE — 99213 OFFICE O/P EST LOW 20 MIN: CPT | Performed by: DERMATOLOGY

## 2024-05-01 PROCEDURE — 3062F POS MACROALBUMINURIA REV: CPT | Performed by: DERMATOLOGY

## 2024-05-01 PROCEDURE — 3048F LDL-C <100 MG/DL: CPT | Performed by: DERMATOLOGY

## 2024-05-01 NOTE — PROGRESS NOTES
Subjective     Frandy Nick is a 73 y.o. male who presents for the following: Skin Check (Waist up exam ).     Review of Systems:  No other skin or systemic complaints other than what is documented elsewhere in the note.    The following portions of the chart were reviewed this encounter and updated as appropriate:   Tobacco  Allergies  Meds  Problems  Med Hx  Surg Hx         Skin Cancer History  No skin cancer on file.      Specialty Problems          Dermatology Problems    Follicular disorder, unspecified        Objective   Well appearing patient in no apparent distress; mood and affect are within normal limits.    A focused skin examination was performed. All findings within normal limits unless otherwise noted below.    Assessment/Plan   Sun-damaged skin  - scattered tan macules, telangiectasias, and general photo-damage    Actinically damaged skin-  - Sun protective behavior reviewed and encouraged including the use of over-the-counter sunscreen with SPF30+ daily (reapply every 1.5 hours when outdoors), UPF clothing, broad rimmed hats, sunglasses, and avoidance of midday sun. Home skin monitoring encouraged and how to monitor for skin cancer (changing or new moles, new rapidly growing or non-healing lesions) reviewed. Patient encouraged to call with interval concerns or changes.      Cherry angioma  - scattered small bright red papules and macules    Cherry angioma(s)  - Discussed benign nature and that no treatment is necessary unless it becomes painful or increases in size. Patient opts for clinical monitoring at this time.      Seborrheic keratoses  - Scattered waxy tan/grey/brown papules with horn cysts    Seborrheic keratosis (-es)  - Discussed benign nature and that no treatment is necessary unless it becomes painful or increases in size. Patient opts for clinical monitoring at this time.      Multiple benign melanocytic nevi  - scattered regular brown macules and papules    Benign melanocytic  nevi  - Discussed benign nature and that no treatment is necessary unless it becomes painful or increases in size. Patient opts for clinical monitoring at this time.    - Sun protective behavior reviewed and encouraged including the use of over-the-counter sunscreen with SPF30+ daily (reapply every 1.5 hours when outdoors), UPF clothing, broad rimmed hats, sunglasses, and avoidance of midday sun. Home skin monitoring encouraged and how to monitor for skin cancer (changing or new moles, new rapidly growing or non-healing lesions) reviewed. Patient encouraged to call with interval concerns or changes.      Inflamed skin tag  Neck - Anterior  Erythematous fleshy pedunculated papule    - Discussed benign nature. Due to reports of pain and irritation when rubbing on his shirt collar and necklace, as well evidence of inflammation on exam, offered treatment with cryotherapy today.    Destr of lesion - Neck - Anterior  Complexity: simple    Destruction method: cryotherapy    Informed consent: discussed and consent obtained    Lesion destroyed using liquid nitrogen: Yes    Cryotherapy cycles:  2  Outcome: patient tolerated procedure well with no complications    Post-procedure details: wound care instructions given        Follow up one year for skin check, sooner as needed    Jean Pierre Will DO, MPH  PGY-4, Dept. of Dermatology    I saw and evaluated the patient, participating in the key elements of the service.  I discussed the findings, assessment and plan with the resident and agree with resident’s findings and plan as documented in the resident's note.  I was immediately available for the entirety of the procedure(s) and present for the key and critical portions.     Gabino Forte MD PhD

## 2024-05-02 DIAGNOSIS — E66.9 OBESITY, UNSPECIFIED: ICD-10-CM

## 2024-05-02 DIAGNOSIS — E11.69 TYPE 2 DIABETES MELLITUS WITH OTHER SPECIFIED COMPLICATION (MULTI): ICD-10-CM

## 2024-05-02 RX ORDER — PEN NEEDLE, DIABETIC 31 GX5/16"
NEEDLE, DISPOSABLE MISCELLANEOUS
Qty: 100 EACH | Refills: 3 | Status: SHIPPED | OUTPATIENT
Start: 2024-05-02

## 2024-05-08 ENCOUNTER — APPOINTMENT (OUTPATIENT)
Dept: DERMATOLOGY | Facility: CLINIC | Age: 74
End: 2024-05-08
Payer: MEDICARE

## 2024-05-15 ENCOUNTER — APPOINTMENT (OUTPATIENT)
Dept: DERMATOLOGY | Facility: CLINIC | Age: 74
End: 2024-05-15
Payer: MEDICARE

## 2024-05-16 DIAGNOSIS — Z79.4 TYPE 2 DIABETES MELLITUS WITH HYPERGLYCEMIA, WITH LONG-TERM CURRENT USE OF INSULIN (MULTI): ICD-10-CM

## 2024-05-16 DIAGNOSIS — E11.65 TYPE 2 DIABETES MELLITUS WITH HYPERGLYCEMIA, WITH LONG-TERM CURRENT USE OF INSULIN (MULTI): ICD-10-CM

## 2024-05-16 RX ORDER — INSULIN DEGLUDEC 100 U/ML
INJECTION, SOLUTION SUBCUTANEOUS
Qty: 15 ML | Refills: 1 | Status: SHIPPED | OUTPATIENT
Start: 2024-05-16

## 2024-05-23 DIAGNOSIS — L73.9 FOLLICULITIS: Primary | ICD-10-CM

## 2024-06-04 RX ORDER — CLINDAMYCIN PHOSPHATE 10 UG/ML
LOTION TOPICAL
Qty: 60 ML | Refills: 5 | Status: SHIPPED | OUTPATIENT
Start: 2024-06-04

## 2024-06-25 ENCOUNTER — APPOINTMENT (OUTPATIENT)
Dept: PRIMARY CARE | Facility: CLINIC | Age: 74
End: 2024-06-25
Payer: MEDICARE

## 2024-06-25 VITALS
RESPIRATION RATE: 18 BRPM | BODY MASS INDEX: 34.51 KG/M2 | OXYGEN SATURATION: 96 % | DIASTOLIC BLOOD PRESSURE: 79 MMHG | HEART RATE: 85 BPM | HEIGHT: 69 IN | TEMPERATURE: 97.6 F | WEIGHT: 233 LBS | SYSTOLIC BLOOD PRESSURE: 120 MMHG

## 2024-06-25 DIAGNOSIS — Z72.0 TOBACCO ABUSE: ICD-10-CM

## 2024-06-25 DIAGNOSIS — E78.5 HYPERLIPIDEMIA LDL GOAL <100: ICD-10-CM

## 2024-06-25 DIAGNOSIS — E11.69 TYPE 2 DIABETES MELLITUS WITH OBESITY (MULTI): ICD-10-CM

## 2024-06-25 DIAGNOSIS — E66.9 TYPE 2 DIABETES MELLITUS WITH OBESITY (MULTI): ICD-10-CM

## 2024-06-25 DIAGNOSIS — N13.8 BPH WITH OBSTRUCTION/LOWER URINARY TRACT SYMPTOMS: ICD-10-CM

## 2024-06-25 DIAGNOSIS — Z12.11 COLON CANCER SCREENING: ICD-10-CM

## 2024-06-25 DIAGNOSIS — N40.1 BPH WITH OBSTRUCTION/LOWER URINARY TRACT SYMPTOMS: ICD-10-CM

## 2024-06-25 DIAGNOSIS — Z23 NEED FOR VACCINATION: ICD-10-CM

## 2024-06-25 DIAGNOSIS — E55.9 HYPOVITAMINOSIS D: ICD-10-CM

## 2024-06-25 DIAGNOSIS — Z11.59 NEED FOR HEPATITIS C SCREENING TEST: Primary | ICD-10-CM

## 2024-06-25 PROBLEM — H43.829 VITREOMACULAR TRACTION SYNDROME: Status: ACTIVE | Noted: 2023-11-06

## 2024-06-25 PROBLEM — M17.11 OSTEOARTHRITIS OF RIGHT KNEE: Status: ACTIVE | Noted: 2024-06-25

## 2024-06-25 PROBLEM — H52.03 HYPEROPIA OF BOTH EYES: Status: ACTIVE | Noted: 2023-11-06

## 2024-06-25 PROBLEM — H25.10 NUCLEAR SENILE CATARACT: Status: ACTIVE | Noted: 2018-01-11

## 2024-06-25 PROBLEM — L73.9 FOLLICULITIS: Status: ACTIVE | Noted: 2023-04-19

## 2024-06-25 PROBLEM — N52.9 ERECTILE DYSFUNCTION: Status: ACTIVE | Noted: 2024-06-25

## 2024-06-25 PROBLEM — R73.01 IMPAIRED FASTING GLUCOSE: Status: ACTIVE | Noted: 2023-11-06

## 2024-06-25 PROBLEM — R39.15 URINARY URGENCY: Status: ACTIVE | Noted: 2023-11-06

## 2024-06-25 PROBLEM — H43.813 POSTERIOR VITREOUS DETACHMENT OF BOTH EYES: Status: ACTIVE | Noted: 2023-11-06

## 2024-06-25 PROBLEM — L57.0 ACTINIC KERATOSIS: Status: ACTIVE | Noted: 2023-01-10

## 2024-06-25 PROBLEM — R94.4 RENAL FUNCTION TEST ABNORMAL: Status: ACTIVE | Noted: 2023-11-06

## 2024-06-25 PROBLEM — B07.9 VERRUCA: Status: ACTIVE | Noted: 2024-06-25

## 2024-06-25 PROBLEM — M25.569 KNEE PAIN: Status: ACTIVE | Noted: 2023-06-25

## 2024-06-25 PROBLEM — H35.40 PERIPHERAL RETINAL DEGENERATION: Status: ACTIVE | Noted: 2023-11-06

## 2024-06-25 PROCEDURE — 82306 VITAMIN D 25 HYDROXY: CPT

## 2024-06-25 PROCEDURE — 3074F SYST BP LT 130 MM HG: CPT | Performed by: INTERNAL MEDICINE

## 2024-06-25 PROCEDURE — 36415 COLL VENOUS BLD VENIPUNCTURE: CPT

## 2024-06-25 PROCEDURE — G0439 PPPS, SUBSEQ VISIT: HCPCS | Performed by: INTERNAL MEDICINE

## 2024-06-25 PROCEDURE — 3062F POS MACROALBUMINURIA REV: CPT | Performed by: INTERNAL MEDICINE

## 2024-06-25 PROCEDURE — 3048F LDL-C <100 MG/DL: CPT | Performed by: INTERNAL MEDICINE

## 2024-06-25 PROCEDURE — 86803 HEPATITIS C AB TEST: CPT

## 2024-06-25 PROCEDURE — 3078F DIAST BP <80 MM HG: CPT | Performed by: INTERNAL MEDICINE

## 2024-06-25 PROCEDURE — 1159F MED LIST DOCD IN RCRD: CPT | Performed by: INTERNAL MEDICINE

## 2024-06-25 PROCEDURE — 80053 COMPREHEN METABOLIC PANEL: CPT

## 2024-06-25 PROCEDURE — 1160F RVW MEDS BY RX/DR IN RCRD: CPT | Performed by: INTERNAL MEDICINE

## 2024-06-25 PROCEDURE — 1170F FXNL STATUS ASSESSED: CPT | Performed by: INTERNAL MEDICINE

## 2024-06-25 PROCEDURE — 99213 OFFICE O/P EST LOW 20 MIN: CPT | Performed by: INTERNAL MEDICINE

## 2024-06-25 PROCEDURE — 1126F AMNT PAIN NOTED NONE PRSNT: CPT | Performed by: INTERNAL MEDICINE

## 2024-06-25 PROCEDURE — 83036 HEMOGLOBIN GLYCOSYLATED A1C: CPT

## 2024-06-25 PROCEDURE — 3044F HG A1C LEVEL LT 7.0%: CPT | Performed by: INTERNAL MEDICINE

## 2024-06-25 RX ORDER — PRAMIPEXOLE DIHYDROCHLORIDE 0.12 MG/1
0.12 TABLET ORAL 3 TIMES DAILY
COMMUNITY
Start: 2024-03-08

## 2024-06-25 RX ORDER — IBUPROFEN 600 MG/1
600 TABLET ORAL 3 TIMES DAILY
COMMUNITY
Start: 2023-08-04 | End: 2024-06-25 | Stop reason: ALTCHOICE

## 2024-06-25 RX ORDER — OXYBUTYNIN CHLORIDE 10 MG/1
10 TABLET, EXTENDED RELEASE ORAL
COMMUNITY
Start: 2022-10-27 | End: 2024-06-25 | Stop reason: ALTCHOICE

## 2024-06-25 ASSESSMENT — PATIENT HEALTH QUESTIONNAIRE - PHQ9
2. FEELING DOWN, DEPRESSED OR HOPELESS: NOT AT ALL
1. LITTLE INTEREST OR PLEASURE IN DOING THINGS: NOT AT ALL
SUM OF ALL RESPONSES TO PHQ9 QUESTIONS 1 AND 2: 0

## 2024-06-25 ASSESSMENT — ACTIVITIES OF DAILY LIVING (ADL)
DOING_HOUSEWORK: INDEPENDENT
BATHING: INDEPENDENT
DRESSING: INDEPENDENT
GROCERY_SHOPPING: INDEPENDENT
TAKING_MEDICATION: INDEPENDENT
MANAGING_FINANCES: INDEPENDENT

## 2024-06-25 ASSESSMENT — ENCOUNTER SYMPTOMS
LOSS OF SENSATION IN FEET: 0
ACTIVITY CHANGE: 0
CHEST TIGHTNESS: 0
SINUS PRESSURE: 0
PALPITATIONS: 0
SORE THROAT: 0
DIARRHEA: 0
AGITATION: 0
CHILLS: 0
NAUSEA: 0
SHORTNESS OF BREATH: 0
MYALGIAS: 0
OCCASIONAL FEELINGS OF UNSTEADINESS: 0
DEPRESSION: 0
WEAKNESS: 0

## 2024-06-25 ASSESSMENT — COLUMBIA-SUICIDE SEVERITY RATING SCALE - C-SSRS
2. HAVE YOU ACTUALLY HAD ANY THOUGHTS OF KILLING YOURSELF?: NO
1. IN THE PAST MONTH, HAVE YOU WISHED YOU WERE DEAD OR WISHED YOU COULD GO TO SLEEP AND NOT WAKE UP?: NO
6. HAVE YOU EVER DONE ANYTHING, STARTED TO DO ANYTHING, OR PREPARED TO DO ANYTHING TO END YOUR LIFE?: NO

## 2024-06-25 ASSESSMENT — PAIN SCALES - GENERAL: PAINLEVEL: 0-NO PAIN

## 2024-06-25 NOTE — PROGRESS NOTES
"Subjective   Patient ID: Frandy Nick is a 74 y.o. male who presents for Medicare Annual Wellness Visit Subsequent.  Working two days a week.  Staying active.  Only smokes cigars when golfing.  He is retired but he still working 2 days a week.    HPI     Review of Systems   Constitutional:  Negative for activity change and chills.   HENT:  Negative for congestion, sinus pressure and sore throat.    Respiratory:  Negative for chest tightness and shortness of breath.    Cardiovascular:  Negative for chest pain and palpitations.   Gastrointestinal:  Negative for diarrhea and nausea.   Musculoskeletal:  Negative for myalgias.   Neurological:  Negative for weakness.   Psychiatric/Behavioral:  Negative for agitation and behavioral problems.        Objective   /79   Pulse 85   Temp 36.4 °C (97.6 °F)   Resp 18   Ht 1.753 m (5' 9\")   Wt 106 kg (233 lb)   SpO2 96%   BMI 34.41 kg/m²     Physical Exam  Constitutional:       Appearance: Normal appearance.   HENT:      Head: Normocephalic and atraumatic.      Nose: Nose normal.      Mouth/Throat:      Mouth: Mucous membranes are moist.   Eyes:      Extraocular Movements: Extraocular movements intact.      Pupils: Pupils are equal, round, and reactive to light.   Cardiovascular:      Rate and Rhythm: Normal rate and regular rhythm.   Pulmonary:      Effort: No respiratory distress.      Breath sounds: No wheezing.   Abdominal:      General: Bowel sounds are normal.      Palpations: Abdomen is soft.   Neurological:      General: No focal deficit present.         Assessment/Plan   Problem List Items Addressed This Visit             ICD-10-CM    BPH with obstruction/lower urinary tract symptoms N40.1, N13.8    Hyperlipidemia LDL goal <100 E78.5    Relevant Orders    Hemoglobin A1C    Comprehensive Metabolic Panel    Type 2 diabetes mellitus with obesity (Multi) E11.69, E66.9    Relevant Orders    Hemoglobin A1C     Other Visit Diagnoses         Codes    Need for " hepatitis C screening test    -  Primary Z11.59    Relevant Orders    Hepatitis C Antibody    Hypovitaminosis D     E55.9    Relevant Orders    Vitamin D 25-Hydroxy,Total (for eval of Vitamin D levels)    Tobacco abuse     Z72.0    Relevant Orders    Vascular US Abdominal Aorta Aneurysm AAA Screening    Colon cancer screening     Z12.11    Relevant Orders    Colonoscopy Screening; High Risk Patient    Need for vaccination     Z23    Relevant Medications    tetanus-diphtheria toxoids-Td (Tenivac 2-2) injection        We reviewed his medication in great detail.  He just has a few more weeks of working part-time and he plans to retire completely.

## 2024-06-26 LAB
25(OH)D3 SERPL-MCNC: 42 NG/ML (ref 30–100)
ALBUMIN SERPL BCP-MCNC: 4.1 G/DL (ref 3.4–5)
ALP SERPL-CCNC: 69 U/L (ref 33–136)
ALT SERPL W P-5'-P-CCNC: 12 U/L (ref 10–52)
ANION GAP SERPL CALC-SCNC: 14 MMOL/L (ref 10–20)
AST SERPL W P-5'-P-CCNC: 13 U/L (ref 9–39)
BILIRUB SERPL-MCNC: 0.4 MG/DL (ref 0–1.2)
BUN SERPL-MCNC: 14 MG/DL (ref 6–23)
CALCIUM SERPL-MCNC: 9.2 MG/DL (ref 8.6–10.6)
CHLORIDE SERPL-SCNC: 107 MMOL/L (ref 98–107)
CO2 SERPL-SCNC: 24 MMOL/L (ref 21–32)
CREAT SERPL-MCNC: 0.9 MG/DL (ref 0.5–1.3)
EGFRCR SERPLBLD CKD-EPI 2021: >90 ML/MIN/1.73M*2
EST. AVERAGE GLUCOSE BLD GHB EST-MCNC: 126 MG/DL
GLUCOSE SERPL-MCNC: 92 MG/DL (ref 74–99)
HBA1C MFR BLD: 6 %
HCV AB SER QL: NONREACTIVE
POTASSIUM SERPL-SCNC: 4 MMOL/L (ref 3.5–5.3)
PROT SERPL-MCNC: 6.3 G/DL (ref 6.4–8.2)
SODIUM SERPL-SCNC: 141 MMOL/L (ref 136–145)

## 2024-06-26 ASSESSMENT — ACTIVITIES OF DAILY LIVING (ADL)
MANAGING_FINANCES: INDEPENDENT
TAKING_MEDICATION: INDEPENDENT
DOING_HOUSEWORK: INDEPENDENT
BATHING: INDEPENDENT
GROCERY_SHOPPING: INDEPENDENT
DRESSING: INDEPENDENT

## 2024-06-28 DIAGNOSIS — Z12.11 COLON CANCER SCREENING: Primary | ICD-10-CM

## 2024-06-28 RX ORDER — SODIUM, POTASSIUM,MAG SULFATES 17.5-3.13G
0.51 SOLUTION, RECONSTITUTED, ORAL ORAL SEE ADMIN INSTRUCTIONS
Qty: 354 ML | Refills: 0 | Status: SHIPPED | OUTPATIENT
Start: 2024-06-28

## 2024-07-02 DIAGNOSIS — Z12.11 COLON CANCER SCREENING: Primary | ICD-10-CM

## 2024-07-02 RX ORDER — SOD SULF/POT CHLORIDE/MAG SULF 1.479 G
12 TABLET ORAL SEE ADMIN INSTRUCTIONS
Qty: 24 TABLET | Refills: 0 | Status: SHIPPED | OUTPATIENT
Start: 2024-07-02

## 2024-07-03 DIAGNOSIS — Z12.11 COLON CANCER SCREENING: Primary | ICD-10-CM

## 2024-07-03 RX ORDER — SODIUM, POTASSIUM,MAG SULFATES 17.5-3.13G
0.51 SOLUTION, RECONSTITUTED, ORAL ORAL SEE ADMIN INSTRUCTIONS
Qty: 354 EACH | Refills: 0 | Status: SHIPPED | OUTPATIENT
Start: 2024-07-03

## 2024-07-10 ENCOUNTER — HOSPITAL ENCOUNTER (OUTPATIENT)
Dept: RADIOLOGY | Facility: CLINIC | Age: 74
Discharge: HOME | End: 2024-07-10
Payer: MEDICARE

## 2024-07-10 DIAGNOSIS — Z72.0 TOBACCO ABUSE: ICD-10-CM

## 2024-07-10 PROCEDURE — 76706 US ABDL AORTA SCREEN AAA: CPT

## 2024-07-10 PROCEDURE — 76706 US ABDL AORTA SCREEN AAA: CPT | Performed by: RADIOLOGY

## 2024-07-15 DIAGNOSIS — Z12.11 COLON CANCER SCREENING: ICD-10-CM

## 2024-07-15 RX ORDER — SOD SULF/POT CHLORIDE/MAG SULF 1.479 G
12 TABLET ORAL SEE ADMIN INSTRUCTIONS
Qty: 24 TABLET | Refills: 0 | Status: SHIPPED | OUTPATIENT
Start: 2024-07-15

## 2024-07-16 RX ORDER — POLYETHYLENE GLYCOL-3350 AND ELECTROLYTES WITH FLAVOR PACK 240; 5.84; 2.98; 6.72; 22.72 G/278.26G; G/278.26G; G/278.26G; G/278.26G; G/278.26G
POWDER, FOR SOLUTION ORAL
Refills: 0 | OUTPATIENT
Start: 2024-07-16

## 2024-07-18 ENCOUNTER — APPOINTMENT (OUTPATIENT)
Dept: NEUROLOGY | Facility: CLINIC | Age: 74
End: 2024-07-18
Payer: MEDICARE

## 2024-07-18 VITALS
HEIGHT: 69 IN | SYSTOLIC BLOOD PRESSURE: 109 MMHG | BODY MASS INDEX: 34.51 KG/M2 | HEART RATE: 88 BPM | DIASTOLIC BLOOD PRESSURE: 70 MMHG | WEIGHT: 233 LBS

## 2024-07-18 DIAGNOSIS — R26.89 IMBALANCE: Primary | ICD-10-CM

## 2024-07-18 PROCEDURE — 99204 OFFICE O/P NEW MOD 45 MIN: CPT | Performed by: PSYCHIATRY & NEUROLOGY

## 2024-07-18 PROCEDURE — 3048F LDL-C <100 MG/DL: CPT | Performed by: PSYCHIATRY & NEUROLOGY

## 2024-07-18 PROCEDURE — 1159F MED LIST DOCD IN RCRD: CPT | Performed by: PSYCHIATRY & NEUROLOGY

## 2024-07-18 PROCEDURE — 3044F HG A1C LEVEL LT 7.0%: CPT | Performed by: PSYCHIATRY & NEUROLOGY

## 2024-07-18 PROCEDURE — 3062F POS MACROALBUMINURIA REV: CPT | Performed by: PSYCHIATRY & NEUROLOGY

## 2024-07-18 PROCEDURE — 3008F BODY MASS INDEX DOCD: CPT | Performed by: PSYCHIATRY & NEUROLOGY

## 2024-07-18 PROCEDURE — 3074F SYST BP LT 130 MM HG: CPT | Performed by: PSYCHIATRY & NEUROLOGY

## 2024-07-18 PROCEDURE — 1160F RVW MEDS BY RX/DR IN RCRD: CPT | Performed by: PSYCHIATRY & NEUROLOGY

## 2024-07-18 PROCEDURE — 3078F DIAST BP <80 MM HG: CPT | Performed by: PSYCHIATRY & NEUROLOGY

## 2024-07-18 NOTE — PATIENT INSTRUCTIONS
We discussed your balance dysfunction.  I do not find evidence of parkinsonism today.  I do however find some evidence of asymmetric vestibular (inner ear) function, weaker on the right compared to the left.  This may account for some tendency to lean/veer rightward when you walk.  You also have asymmetric arm reflexes, depressed on the right, which however may relate to your history of cervical radiculopathy.    I recommend proceeding with a brain MRI with internal auditory canal (IAC) protocol.  As you indicated claustrophobia I would recommend scheduling this at the [facility] open sided MRI facility.    I also recommend checking a vitamin B12 level.  B12 is important for balance and can be depleted by metformin.    You indicated daily use of a Neti pot.  As we discussed, I recommend using only sterile or distilled water for this purpose, as there is risk of infection with use of tap water.    I will contact you with study results.  Then we we will determine further evaluation and management steps and timing of your next office visit.

## 2024-07-18 NOTE — PROGRESS NOTES
Subjective     Frandy Nick is a 74 y.o. year old right handed male presenting as a new patient for balance dysfunction.    HPI    He has past medical history significant for type 2 diabetes, hyperlipidemia, BPH, former smoking, cervical and lumbar disc disease, headaches including a reported past history of cluster headache, and a number of sleep-related conditions (SONAM, REM behavior disorder and RLS) for which he is followed by neurology at [facility].    He is evaluated in the office today as a new patient, self-referred to establish neurologic care through .  He previously was seen by Dr. Artemio Matthew, [facility] neurology, in March 2023 and I reviewed that report.    He presents for balance concerns.  At the time of his neurologic evaluation at [facility] in March 2023 he endorsed a 1-2-year history of balance dysfunction.  He indicates feeling mildly off balance with some lateral instability during gait, but not to the point of requiring assistive devices for ambulation.  He has not been falling.  Because of podiatric issues he wears shoes with additional arch support and these have relatively thick soles, because of which he has the impression occasionally that he is shuffling.  However, he does not endorse freezing of gait.  He denies festination.    He feels that his gait speed has declined somewhat over the past few years.  He indicates in general he moves a bit more slowly, including when he is climbing stairs.  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    He feels more off balance attempting to stand on the left foot versus the right, which he relates potentially to a longstanding great toe injury on the left.  For instance, if he is putting on his pants he can balance easily on the right foot to put on the left pants leg but has more difficulty balancing on the left foot.    He denies difficulty  "getting out of chairs or getting out of cars unless a very low vehicle such as a sports car.    He endorses some chronic low back pain but does not indicate it being major factor limiting his gait.    Subjectively strength is reasonably good in all limbs.  He endorses a history of right cervical radiculopathy which has not been symptomatic recently.    He endorses a minor degree of numbness involving the great toes, left worse than right.  He endorses some pain in the feet on an intermittent basis.    He does not endorse vertigo, postural lightheadedness or other dizziness.  He does indicate feeling generally fatigued/tired much of the time, and is pending an appointment in this regard with his [facility] sleep neurologist.    He endorses a remote history of \"cluster headache\", with pain consistently localized to the right periorbital region and occurring with circadian periodicity.  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  These headaches began in his 30s and there is a family history in his father.  More recently he has experienced much less intense discomfort that he attributes to sinuses and which seems to be kept under control by daily use of a Neti pot.    I do not find recent head imaging.  I do not find a recent vitamin B12 level.  Last hemoglobin A1c was 6% on 6/25.    Review of Systems    Review of Systems:  Neurologic:  As per the history of present illness.  Constitutional:  Negative for fevers, chills, or weight loss, positive for fatigue.  Musculoskeletal: Positive for neck and back pain. Cardiovascular:  Negative for chest pain or palpitations.  Respiratory:  Negative for dyspnea.  Eyes:  Negative for vision loss or diplopia.  ENT:  Negative for hearing loss or tinnitus.  GI:  Negative for bowel incontinence.  :  Negative for bladder incontinence.  Dermatologic:  Negative for rash.        Patient Active Problem List "   Diagnosis    Healthcare maintenance    Fatigue    Generalized abdominal pain    Acute pain of right knee    Abnormal renal function test    Allergic rhinitis    Anxiety    Astigmatism of both eyes with presbyopia    Bilateral presbyopia    Benign prostatic hyperplasia without lower urinary tract symptoms    BPH with obstruction/lower urinary tract symptoms    Cluster headaches    Cortical age-related cataract of both eyes    Diabetes mellitus type 2 in obese    Displacement of lumbar intervertebral disc without myelopathy    Erectile dysfunction associated with type 2 diabetes mellitus (Multi)    Follicular disorder, unspecified    Gastric ulcer    Gastroesophageal reflux disease    Hypercholesteremia    Hyperlipidemia LDL goal <100    Hypertriglyceridemia    Hypermetropia of both eyes    IFG (impaired fasting glucose)    Major depressive disorder with single episode, in partial remission (CMS-HCC)    Migraine    Nuclear sclerosis of both eyes    Nuclear senile cataract of both eyes    Obstructive sleep apnea syndrome    Peripheral reticular degeneration of both eyes    Polyp of colon    PVD (posterior vitreous detachment), right eye    REM sleep behavior disorder    Restless legs syndrome    Scrotal mass    Type 2 diabetes mellitus with hyperglycemia (Multi)    Urgency of urination    Vitamin D deficiency    Vitreomacular traction syndrome of left eye    Actinic keratosis    Folliculitis    Osteoarthritis of right knee    Verruca    Renal function test abnormal    Knee pain    Benign prostatic hyperplasia with urinary obstruction    Type 2 diabetes mellitus with obesity (Multi)    Herniation of lumbar intervertebral disc without myelopathy    Erectile dysfunction    Hypercholesterolemia    Hyperopia of both eyes    Impaired fasting glucose    Migraine headache    Nuclear senile cataract    Peripheral retinal degeneration    Posterior vitreous detachment of both eyes    Vitreomacular traction syndrome    Urinary  urgency     Past Medical History:   Diagnosis Date    Age-related nuclear cataract, bilateral     Nuclear senile cataract of both eyes    Allergic rhinitis, unspecified 08/31/2021    Allergic rhinitis    Anxiety disorder, unspecified 08/31/2021    Anxiety    Cluster headache syndrome, unspecified, not intractable     Cluster headaches    Gastric ulcer, unspecified as acute or chronic, without hemorrhage or perforation 08/30/2021    Gastric ulcer    Gastro-esophageal reflux disease without esophagitis 08/31/2021    Acid reflux    Gastro-esophageal reflux disease without esophagitis     GERD (gastroesophageal reflux disease)    Impaired fasting glucose     IFG (impaired fasting glucose)    Male erectile dysfunction, unspecified 10/27/2022    Impotence    Migraine, unspecified, not intractable, without status migrainosus 08/31/2021    Migraine    Other specified disorders of the male genital organs     Scrotal mass    Pure hypercholesterolemia, unspecified 08/31/2021    Hypercholesteremia    Pure hyperglyceridemia     Hypertriglyceridemia    REM sleep behavior disorder 12/15/2022    Controlled REM sleep behavior disorder    Restless legs syndrome 10/06/2022    Restless leg syndrome     No past surgical history on file.  Social History     Tobacco Use    Smoking status: Some Days     Types: Cigars     Passive exposure: Past    Smokeless tobacco: Never   Substance Use Topics    Alcohol use: Yes     Alcohol/week: 2.0 standard drinks of alcohol     Types: 2 Shots of liquor per week     family history is not on file.    Current Outpatient Medications:     albuterol 90 mcg/actuation inhaler, Inhale 2 puffs every 6 hours if needed., Disp: , Rfl:     azelastine (Astelin) 137 mcg (0.1 %) nasal spray, Administer 1 spray into affected nostril(s) twice a day., Disp: , Rfl:     clonazePAM (KlonoPIN) 2 mg tablet, Take 1 tablet (2 mg) by mouth 2 times a day as needed., Disp: , Rfl:     esomeprazole (NexIUM) 20 mg DR capsule, Take 1  "capsule (20 mg) by mouth once daily in the morning. Take before meals., Disp: , Rfl:     Farxiga 10 mg, Take 1 tablet (10 mg) by mouth once daily in the morning. Take before meals., Disp: 90 tablet, Rfl: 1    FreeStyle Lorenzo reader (FreeStyle Lorenzo 2 [place]) misc, Inject 1 each under the skin if needed. Use as instructed, Disp: , Rfl:     insulin degludec (Tresiba FlexTouch U-100) 100 unit/mL (3 mL) injection, INJECT 15 UNITS SUBCUTANEOUSLY AT BEDTIME, Disp: 15 mL, Rfl: 1    metFORMIN (Glucophage) 1,000 mg tablet, TAKE 1 TABLET TWICE A DAY (Patient taking differently: Take 0.5 tablets (500 mg) by mouth 2 times a day.), Disp: 180 tablet, Rfl: 3    pen needle, diabetic (BD Ultra-Fine Mini Pen Needle) 31 gauge x 3/16\" needle, USE WITH INSULIN NIGHTLY, Disp: 100 each, Rfl: 3    pramipexole (Mirapex) 0.125 mg tablet, Take 1 tablet (0.125 mg) by mouth 3 times a day., Disp: , Rfl:     rosuvastatin (Crestor) 20 mg tablet, TAKE 1 TABLET BY MOUTH EVERY DAY, Disp: 90 tablet, Rfl: 3    sod sulf-pot chloride-mag sulf (Sutab) 1.479-0.188- 0.225 gram tablet, Take 12 tablets by mouth see administration instructions., Disp: 24 tablet, Rfl: 0    sodium,potassium,mag sulfates (Suprep) 17.5-3.13-1.6 gram recon soln solution, Take 1 bottle by mouth see administration instructions., Disp: 354 each, Rfl: 0    SUMAtriptan (Imitrex) 5 mg/actuation nasal spray, Administer 1 spray (5 mg) into affected nostril(s) every 2 hours if needed., Disp: , Rfl:     tadalafil (Cialis) 5 mg tablet, Take 1 tablet (5 mg) by mouth once daily., Disp: , Rfl:     tadalafil 20 mg tablet, Take by mouth., Disp: , Rfl:   Allergies   Allergen Reactions    Dulaglutide Swelling    Prednisone Unknown and Other     patient states he gets angry when he's on prednisone    Semaglutide Unknown    Venlafaxine Unknown       Objective   Neurological Exam  Physical Exam    Physical Examination:    General: Alert man who was ambulatory without assistive devices.      Mental " Status: Clear sensorium without fluctuation.  Appropriate and oriented in conversation.  Recent and remote recall intact for details of medical history.  Attention and concentration intact during interview.  Fund of knowledge fair for medical information.  Language intact and fluent without paraphasic errors.    Cranial Nerves:  Funduscopic exam revealed a sharp temporal disc margin OS and was not well visualized OD on nondilated exam.  Pupils were equal, round and reactive to light with no relative afferent pupillary defect.  Extraocular movements were intact and conjugate without nystagmus.  There was no slowing of vertical saccades.  No ptosis.  Visual fields were full to confrontation tested binocularly.  Facial sensation was symmetric to pin.  Facial motor function was symmetrically intact.  Hearing was grossly intact.  No dysarthria.  Shoulder shrug was symmetric.  Tongue protrusion was midline.    Motor: Muscle bulk and tone were normal throughout. There was no pronator drift or asymmetry of finger taps. Confrontation strength was symmetrically 5/5 throughout the upper and lower extremities.     Coordination: Finger to finger, heel to shin, and alternating hand movements were rapid and accurate without dysmetria.  Repetitive hand movements were symmetric and rapid.  There was no tremor, myoclonus or dystonic posturing.  He past pointed far to his right with his left upper extremity and did not consistently past point with his right upper extremity.  On Fukuda test he rotated slightly to his right after about 30 seconds.    Tendon Reflexes: Symmetrically trace triceps, 2+ patellar, trace ankles, neutral plantars.  Biceps and brachioradialis were absent right and 2+ left.    Sensation: Pin was symmetrically sharp over the dorsal hands.  Vibration thresholds were normal at the index fingers.  Vibration perception was present faintly at the great toes, strongly at the medial malleolus bilaterally.  Romberg sign  was absent.    Station: Intact and stable.    Gait: Stable and unremarkable except for a mild intermittent tendency to list or lean to his right.  He was able to accelerate to a moderately rapid speed.  No shuffling, freezing or festination.  Normal base width.  No significant postural flexion.  Mild-moderate difficulty with tandem.      Assessment/Plan     This gentleman presents with balance complaints noted for the past few years.    As was the case at his [facility] neurology evaluation in March 2023, today there is again no compelling evidence of parkinsonism despite the fact that he has REM behavior disorder.    I discussed with him that there is some evidence to suggest asymmetric vestibular function, weaker on the right.  As such I recommended proceeding with a contrasted IAC protocol MRI.  I reviewed recent renal function labs from 6/25 which were normal.  He is somewhat claustrophobic and I recommended the [facility] open sided MRI scanner, with which he was agreeable.    Given balance complaints and the fact that he has been on metformin I also recommended checking a vitamin B12 level.  He indicates his preference may be simply to start on B12 replacement and I suggested starting with 1000 mcg daily if he does so.    I will contact him regarding study results.     I encouraged him to follow-up in the near future with his sleep neurologist at [facility] regarding his daytime fatigue, in the context of multiple sleep related conditions.    As he mentioned use of a Neti pot daily which helps to keep headaches under control, I advised him to use only sterile or distilled water for this purpose rather than tap water.    We will determine further evaluation and management steps and timing of his next office visit after the above studies are completed.

## 2024-07-19 ENCOUNTER — TELEPHONE (OUTPATIENT)
Dept: SCHEDULING | Age: 74
End: 2024-07-19
Payer: MEDICARE

## 2024-07-29 DIAGNOSIS — Z79.4 TYPE 2 DIABETES MELLITUS WITH HYPERGLYCEMIA, WITH LONG-TERM CURRENT USE OF INSULIN (MULTI): ICD-10-CM

## 2024-07-29 DIAGNOSIS — E11.65 TYPE 2 DIABETES MELLITUS WITH HYPERGLYCEMIA, WITH LONG-TERM CURRENT USE OF INSULIN (MULTI): ICD-10-CM

## 2024-07-29 RX ORDER — DAPAGLIFLOZIN 10 MG/1
10 TABLET, FILM COATED ORAL
Qty: 90 TABLET | Refills: 0 | Status: SHIPPED | OUTPATIENT
Start: 2024-07-29

## 2024-08-02 ENCOUNTER — HOSPITAL ENCOUNTER (OUTPATIENT)
Dept: RADIOLOGY | Facility: HOSPITAL | Age: 74
Discharge: HOME | End: 2024-08-02
Payer: MEDICARE

## 2024-08-02 ENCOUNTER — HOSPITAL ENCOUNTER (OUTPATIENT)
Dept: RADIOLOGY | Facility: CLINIC | Age: 74
End: 2024-08-02
Payer: MEDICARE

## 2024-08-02 VITALS — WEIGHT: 233 LBS | BODY MASS INDEX: 34.41 KG/M2

## 2024-08-02 DIAGNOSIS — R26.89 IMBALANCE: ICD-10-CM

## 2024-08-02 PROCEDURE — 2550000001 HC RX 255 CONTRASTS: Performed by: PSYCHIATRY & NEUROLOGY

## 2024-08-02 PROCEDURE — 70553 MRI BRAIN STEM W/O & W/DYE: CPT

## 2024-08-02 PROCEDURE — A9575 INJ GADOTERATE MEGLUMI 0.1ML: HCPCS | Performed by: PSYCHIATRY & NEUROLOGY

## 2024-08-02 RX ORDER — GADOTERATE MEGLUMINE 376.9 MG/ML
20 INJECTION INTRAVENOUS
Status: COMPLETED | OUTPATIENT
Start: 2024-08-02 | End: 2024-08-02

## 2024-08-05 ENCOUNTER — DOCUMENTATION (OUTPATIENT)
Dept: NEUROLOGY | Facility: HOSPITAL | Age: 74
End: 2024-08-05
Payer: MEDICARE

## 2024-08-05 DIAGNOSIS — Z86.73 HISTORY OF STROKE: Primary | ICD-10-CM

## 2024-08-05 DIAGNOSIS — R26.89 IMBALANCE: ICD-10-CM

## 2024-08-05 RX ORDER — CLOPIDOGREL BISULFATE 75 MG/1
75 TABLET ORAL DAILY
Qty: 90 TABLET | Refills: 3 | Status: SHIPPED | OUTPATIENT
Start: 2024-08-05 | End: 2025-08-05

## 2024-08-05 NOTE — PROGRESS NOTES
I have communicated via [employer] messages with him today regarding results of the recently completed contrasted IAC protocol brain MRI.    The notable finding was a small cavitary lesion in the right cerebral peduncle possibly representing a remote but ultimately age-indeterminate lacunar stroke.    I discussed with him that I am not sure whether the above lesion plays any role in his balance dysfunction.  However, as it is a possible remote stroke, I recommended antiplatelet therapy.  He indicates history of an ulcer with aspirin so I recommended clopidogrel instead but advised him to contact the office should he note undue bleeding or bruising.    I also advised him that given suspicion of a prior stroke based on the MRI finding, he should no longer use sumatriptan and should avoid other triptan medications as well.    Of note on my review of the MRI, is that the sagittal sequences show significant cervical spondylosis.  In particular I suspect cord compression by a large disc osteophyte complex at C3/4.  This is not adequately visualized on the IAC protocol MRI.    I discussed with him that cervical spondylosis could be the explanation for his imbalance.  As such I recommended proceeding to a cervical spine MRI.  He was agreeable, and I will contact him with MRI results.

## 2024-08-09 ENCOUNTER — APPOINTMENT (OUTPATIENT)
Dept: RADIOLOGY | Facility: HOSPITAL | Age: 74
End: 2024-08-09
Payer: MEDICARE

## 2024-08-12 ENCOUNTER — HOSPITAL ENCOUNTER (OUTPATIENT)
Dept: RADIOLOGY | Facility: HOSPITAL | Age: 74
Discharge: HOME | End: 2024-08-12
Payer: MEDICARE

## 2024-08-12 DIAGNOSIS — R26.89 IMBALANCE: ICD-10-CM

## 2024-08-12 PROCEDURE — 72141 MRI NECK SPINE W/O DYE: CPT

## 2024-08-12 PROCEDURE — 72141 MRI NECK SPINE W/O DYE: CPT | Performed by: RADIOLOGY

## 2024-08-13 DIAGNOSIS — M47.12 CERVICAL SPONDYLOSIS WITH MYELOPATHY: Primary | ICD-10-CM

## 2024-08-13 NOTE — PROGRESS NOTES
"FUV for diabetes. LV with me 03/2024.    History of Present Illness  74 y.o. male with hx of type 2 diabetes, obesity class I, HLD.     Dx: in 60s  HbA1c: 6.0% (6/25/2024), 6.2% (03/2024), 6.2% (11/15/2023), 6.3% (7/5/2023), 6.6% (03/2023), 13.2% (12/2022)  Current regimen: metformin 500-1000 mg BID, Tresiba/Toujeo 15 units QHS, Farxiga 10 mg QDAY  Past medications: Ozempic (pancreatitis), Trulicity, Victoza  Complications: none  Comorbidities: obesity, HLD     SMBG: Lorenzo 2     Hypoglycemia: no    Today:  -tried \"java burn\" for weight loss but feeling very tired with no appetite during the day since  starting this, so he will stop this    ROS  General: no fever or chills  CV: no chest pain   Respiratory: no shortness of breath  MSK: no lower extremity edema  Neuro: no headache or dizziness  See HPI for Endocrine ROS    Physical Exam   height is 1.753 m (5' 9\") and weight is 106 kg (234 lb). His blood pressure is 124/73 and his pulse is 83.   General: not in acute distress  HEENT: MOISES HOGUE  Thyroid: no goiter  Neuro: alert and oriented x 3  Diabetic foot exam: 1+ pedal pulses bilaterally, 10/10 monofilament bilaterally, +chronic callouses on bilateral toes but no open wounds or blisters    Current Outpatient Medications   Medication Sig Dispense Refill    albuterol 90 mcg/actuation inhaler Inhale 2 puffs every 6 hours if needed.      azelastine (Astelin) 137 mcg (0.1 %) nasal spray Administer 1 spray into affected nostril(s) twice a day.      clonazePAM (KlonoPIN) 2 mg tablet Take 1 tablet (2 mg) by mouth 2 times a day as needed.      clopidogrel (Plavix) 75 mg tablet Take 1 tablet (75 mg) by mouth once daily. 90 tablet 3    esomeprazole (NexIUM) 20 mg DR capsule Take 1 capsule (20 mg) by mouth once daily in the morning. Take before meals.      Farxiga 10 mg TAKE 1 TABLET (10 MG) BY MOUTH ONCE DAILY IN THE MORNING. TAKE BEFORE MEALS. 90 tablet 0    FreeStyle Lorenzo reader (FreeStyle Lorenzo 2 [place]) misc Inject 1 " "each under the skin if needed. Use as instructed      insulin degludec (Tresiba FlexTouch U-100) 100 unit/mL (3 mL) injection INJECT 15 UNITS SUBCUTANEOUSLY AT BEDTIME 15 mL 0    metFORMIN (Glucophage) 1,000 mg tablet TAKE 1 TABLET TWICE A DAY (Patient taking differently: Take 0.5 tablets (500 mg) by mouth 2 times a day.) 180 tablet 3    pen needle, diabetic (BD Ultra-Fine Mini Pen Needle) 31 gauge x 3/16\" needle USE WITH INSULIN NIGHTLY 100 each 3    pramipexole (Mirapex) 0.125 mg tablet Take 1 tablet (0.125 mg) by mouth 3 times a day.      rosuvastatin (Crestor) 20 mg tablet TAKE 1 TABLET BY MOUTH EVERY DAY 90 tablet 3    sod sulf-pot chloride-mag sulf (Sutab) 1.479-0.188- 0.225 gram tablet Take 12 tablets by mouth see administration instructions. 24 tablet 0    tadalafil (Cialis) 5 mg tablet Take 1 tablet (5 mg) by mouth once daily.      tadalafil 20 mg tablet Take by mouth.      sodium,potassium,mag sulfates (Suprep) 17.5-3.13-1.6 gram recon soln solution Take 1 bottle by mouth see administration instructions. 354 each 0    SUMAtriptan (Imitrex) 5 mg/actuation nasal spray Administer 1 spray (5 mg) into affected nostril(s) every 2 hours if needed.       No current facility-administered medications for this visit.         Assessment and Plan  74 y.o. male with hx of type 2 diabetes, obesity class I, HLD, here for management of diabetes and medication side effects.     1. Type 2 diabetes mellitus  2. Obesity class I  HbA1c: 6.0% (6/25/2024), 6.2% (03/2024), 6.2% (11/15/2023), 6.3% (7/5/2023), 6.6% (3/29/2023), 13.2% (12/2022), 6.7% (07/2022), 6.3% (08/2021)  Current regimen: metformin 500-1000 mg BID, Tresiba 15 units QHS, Farxiga 10 mg QDAY  Eye exam: no DR (03/2024)  Urine microalbumin: neg (03/2024)  Podiatry: foot exam done 8/19/2024  Lipids: HDL 48, LDL 77,  (03/2023) on rosuvastatin 20 mg  *Avoid GLP-1RA-pancreatitis on Ozempic     A1c: 6.0% end of June.  Lorenzo download reviewed.    Maintaining " excellent glycemic control.    Chronic GI issues with bloating, diarrhea, abdominal tenderness for years.  He reported worsening fatigue, nausea, bloating since starting Basaglar. Symptoms improved with dose reduction. Reports that Tresiba has caused the least degree of side effects by far and he is tolerating this.    PLAN:  -continue Tresiba 15 units QHS  -continue Farxiga 10 mg QDAY  -continue metformin 500-100 mg BID  -check blood sugars twice a day (before breakfast and dinner)  -Lorenzo Huang  -check HbA1c, lipids, urine microalbumin before next visit    Follow-up in 5 months (labs prior)  Uses MyChart.

## 2024-08-14 ENCOUNTER — TELEPHONE (OUTPATIENT)
Dept: NEUROLOGY | Facility: CLINIC | Age: 74
End: 2024-08-14
Payer: MEDICARE

## 2024-08-14 NOTE — TELEPHONE ENCOUNTER
Spoke with patient [facility] will help him arrange appointment he will reach out to us if assistance is needed

## 2024-08-14 NOTE — TELEPHONE ENCOUNTER
----- Message from Jr Cristian sent at 8/13/2024 12:48 PM EDT -----  Please help him get scheduled with spine surgery for a consultation in the near future.  I am referring him to Dr. Sears.  If he is not available, then please try Rogers Nye or Juliano Mejia.

## 2024-08-16 DIAGNOSIS — Z79.4 TYPE 2 DIABETES MELLITUS WITH HYPERGLYCEMIA, WITH LONG-TERM CURRENT USE OF INSULIN (MULTI): ICD-10-CM

## 2024-08-16 DIAGNOSIS — E11.65 TYPE 2 DIABETES MELLITUS WITH HYPERGLYCEMIA, WITH LONG-TERM CURRENT USE OF INSULIN (MULTI): ICD-10-CM

## 2024-08-16 RX ORDER — INSULIN DEGLUDEC 100 U/ML
INJECTION, SOLUTION SUBCUTANEOUS
Qty: 15 ML | Refills: 0 | Status: SHIPPED | OUTPATIENT
Start: 2024-08-16

## 2024-08-19 ENCOUNTER — APPOINTMENT (OUTPATIENT)
Dept: ENDOCRINOLOGY | Facility: CLINIC | Age: 74
End: 2024-08-19
Payer: MEDICARE

## 2024-08-19 VITALS
SYSTOLIC BLOOD PRESSURE: 124 MMHG | WEIGHT: 234 LBS | DIASTOLIC BLOOD PRESSURE: 73 MMHG | HEART RATE: 83 BPM | HEIGHT: 69 IN | BODY MASS INDEX: 34.66 KG/M2

## 2024-08-19 DIAGNOSIS — Z79.4 TYPE 2 DIABETES MELLITUS WITH HYPERGLYCEMIA, WITH LONG-TERM CURRENT USE OF INSULIN (MULTI): ICD-10-CM

## 2024-08-19 DIAGNOSIS — E11.65 TYPE 2 DIABETES MELLITUS WITH HYPERGLYCEMIA, WITH LONG-TERM CURRENT USE OF INSULIN (MULTI): ICD-10-CM

## 2024-08-19 PROCEDURE — 3062F POS MACROALBUMINURIA REV: CPT | Performed by: STUDENT IN AN ORGANIZED HEALTH CARE EDUCATION/TRAINING PROGRAM

## 2024-08-19 PROCEDURE — 99214 OFFICE O/P EST MOD 30 MIN: CPT | Performed by: STUDENT IN AN ORGANIZED HEALTH CARE EDUCATION/TRAINING PROGRAM

## 2024-08-19 PROCEDURE — 95251 CONT GLUC MNTR ANALYSIS I&R: CPT | Performed by: STUDENT IN AN ORGANIZED HEALTH CARE EDUCATION/TRAINING PROGRAM

## 2024-08-19 PROCEDURE — 3074F SYST BP LT 130 MM HG: CPT | Performed by: STUDENT IN AN ORGANIZED HEALTH CARE EDUCATION/TRAINING PROGRAM

## 2024-08-19 PROCEDURE — 3008F BODY MASS INDEX DOCD: CPT | Performed by: STUDENT IN AN ORGANIZED HEALTH CARE EDUCATION/TRAINING PROGRAM

## 2024-08-19 PROCEDURE — 1159F MED LIST DOCD IN RCRD: CPT | Performed by: STUDENT IN AN ORGANIZED HEALTH CARE EDUCATION/TRAINING PROGRAM

## 2024-08-19 PROCEDURE — 3044F HG A1C LEVEL LT 7.0%: CPT | Performed by: STUDENT IN AN ORGANIZED HEALTH CARE EDUCATION/TRAINING PROGRAM

## 2024-08-19 PROCEDURE — 1126F AMNT PAIN NOTED NONE PRSNT: CPT | Performed by: STUDENT IN AN ORGANIZED HEALTH CARE EDUCATION/TRAINING PROGRAM

## 2024-08-19 PROCEDURE — 3078F DIAST BP <80 MM HG: CPT | Performed by: STUDENT IN AN ORGANIZED HEALTH CARE EDUCATION/TRAINING PROGRAM

## 2024-08-19 PROCEDURE — 3048F LDL-C <100 MG/DL: CPT | Performed by: STUDENT IN AN ORGANIZED HEALTH CARE EDUCATION/TRAINING PROGRAM

## 2024-08-19 RX ORDER — INSULIN DEGLUDEC 100 U/ML
INJECTION, SOLUTION SUBCUTANEOUS
Qty: 15 ML | Refills: 3 | Status: SHIPPED | OUTPATIENT
Start: 2024-08-19

## 2024-08-19 RX ORDER — METFORMIN HYDROCHLORIDE 1000 MG/1
TABLET ORAL
Qty: 180 TABLET | Refills: 3 | Status: SHIPPED | OUTPATIENT
Start: 2024-08-19

## 2024-08-19 RX ORDER — DAPAGLIFLOZIN 10 MG/1
10 TABLET, FILM COATED ORAL
Qty: 90 TABLET | Refills: 0 | Status: SHIPPED | OUTPATIENT
Start: 2024-08-19

## 2024-08-19 ASSESSMENT — PAIN SCALES - GENERAL: PAINLEVEL: 0-NO PAIN

## 2024-08-27 ENCOUNTER — OFFICE VISIT (OUTPATIENT)
Dept: ORTHOPEDIC SURGERY | Facility: CLINIC | Age: 74
End: 2024-08-27
Payer: MEDICARE

## 2024-08-27 VITALS — BODY MASS INDEX: 33.33 KG/M2 | HEIGHT: 69 IN | WEIGHT: 225 LBS

## 2024-08-27 DIAGNOSIS — M47.12 CERVICAL SPONDYLOSIS WITH MYELOPATHY: ICD-10-CM

## 2024-08-27 PROCEDURE — 99214 OFFICE O/P EST MOD 30 MIN: CPT | Performed by: ORTHOPAEDIC SURGERY

## 2024-08-27 PROCEDURE — 3008F BODY MASS INDEX DOCD: CPT | Performed by: ORTHOPAEDIC SURGERY

## 2024-08-27 PROCEDURE — 1159F MED LIST DOCD IN RCRD: CPT | Performed by: ORTHOPAEDIC SURGERY

## 2024-08-27 PROCEDURE — 3062F POS MACROALBUMINURIA REV: CPT | Performed by: ORTHOPAEDIC SURGERY

## 2024-08-27 PROCEDURE — 3048F LDL-C <100 MG/DL: CPT | Performed by: ORTHOPAEDIC SURGERY

## 2024-08-27 PROCEDURE — 3044F HG A1C LEVEL LT 7.0%: CPT | Performed by: ORTHOPAEDIC SURGERY

## 2024-08-27 NOTE — LETTER
August 27, 2024     Josh Frias MD  [address]  [address]  [place] [address]    Patient: Frandy Nick   YOB: 1950   Date of Visit: 8/27/2024       Dear Dr. Josh Frias MD:    Thank you for referring Frandy Nick to me for evaluation. Below are my notes for this consultation.  If you have questions, please do not hesitate to call me. I look forward to following your patient along with you.       Sincerely,     Pramod Sears MD      CC: Jr Foster MD  ______________________________________________________________________________________    Jagdish is a 74-year-old  in  who is referred by Dr. David Foster.    He has had some balance issues over the last year or so.  They have not worsened.  He relates some of this to a arthritic condition in his left great toe.    He has not experienced any significant axial neck pain nor severe cervical radicular pain.    He does recall an episode in his 30s where he had severe axial neck pain and cervical radiculopathy but that did improve.  He also recalls taking a lot of aspirin at the time that led to a peptic ulcer.    He is a type II diabetic.  Otherwise his general health is stable.    Family, social, and medical histories are obtained and reviewed.    30-point, patient-recorded Review of Systems is personally obtained and reviewed. Inclusive is no history of weight loss, change in appetite, recent change in activity level, change in bowel or bladder habits, fevers, chills, malaise, or night pain.    Healthy-appearing patient no acute distress.  Stable gait. Tandem ambulation without difficulty. Painless motion cervical spine. Negative Lhermitte's. Strength is intact both upper and lower extremities. Sensation intact. No hyporeflexia upper or lower extremities.    His cervical MRI shows multilevel spondylitic changes, most notable at C3-4, C4-5 and C5-6, where there are disc and osteophyte complexes at  each level.  The result is canal stenosis although no high-grade spinal cord compression.    Impression: This man does have cervical spondylosis with canal stenosis but no spinal cord compression and no severe myelopathic features.  We have discussed the nature of this.  We talked about the different approaches of considering prophylactic surgery versus a watch and wait approach.  In the absence of any severe myelopathic features, I think observation is appropriate.  I have educated him on things to look for.  He has a good understanding of this and he will keep me updated on his progress.    ** Dictated with voice recognition software and not immediately reviewed for errors in grammar and/or spelling **

## 2024-09-10 RX ORDER — SODIUM CHLORIDE, SODIUM LACTATE, POTASSIUM CHLORIDE, CALCIUM CHLORIDE 600; 310; 30; 20 MG/100ML; MG/100ML; MG/100ML; MG/100ML
20 INJECTION, SOLUTION INTRAVENOUS CONTINUOUS
Status: CANCELLED | OUTPATIENT
Start: 2024-09-10

## 2024-09-16 ENCOUNTER — HOSPITAL ENCOUNTER (OUTPATIENT)
Dept: GASTROENTEROLOGY | Facility: HOSPITAL | Age: 74
Discharge: HOME | End: 2024-09-16
Payer: MEDICARE

## 2024-09-16 ENCOUNTER — ANESTHESIA EVENT (OUTPATIENT)
Dept: GASTROENTEROLOGY | Facility: HOSPITAL | Age: 74
End: 2024-09-16
Payer: MEDICARE

## 2024-09-16 ENCOUNTER — ANESTHESIA (OUTPATIENT)
Dept: GASTROENTEROLOGY | Facility: HOSPITAL | Age: 74
End: 2024-09-16
Payer: MEDICARE

## 2024-09-16 VITALS
SYSTOLIC BLOOD PRESSURE: 109 MMHG | HEART RATE: 62 BPM | RESPIRATION RATE: 16 BRPM | WEIGHT: 223.55 LBS | BODY MASS INDEX: 33.11 KG/M2 | TEMPERATURE: 97.3 F | HEIGHT: 69 IN | OXYGEN SATURATION: 96 % | DIASTOLIC BLOOD PRESSURE: 68 MMHG

## 2024-09-16 DIAGNOSIS — Z12.11 COLON CANCER SCREENING: ICD-10-CM

## 2024-09-16 LAB
GLUCOSE BLD MANUAL STRIP-MCNC: 101 MG/DL (ref 74–99)
GLUCOSE BLD MANUAL STRIP-MCNC: 97 MG/DL (ref 74–99)

## 2024-09-16 PROCEDURE — 3700000001 HC GENERAL ANESTHESIA TIME - INITIAL BASE CHARGE

## 2024-09-16 PROCEDURE — 2500000004 HC RX 250 GENERAL PHARMACY W/ HCPCS (ALT 636 FOR OP/ED): Performed by: ANESTHESIOLOGIST ASSISTANT

## 2024-09-16 PROCEDURE — 88305 TISSUE EXAM BY PATHOLOGIST: CPT | Mod: TC,AHULAB | Performed by: INTERNAL MEDICINE

## 2024-09-16 PROCEDURE — 82947 ASSAY GLUCOSE BLOOD QUANT: CPT

## 2024-09-16 PROCEDURE — A45385 PR COLONOSCOPY,REMV LESN,SNARE: Performed by: ANESTHESIOLOGIST ASSISTANT

## 2024-09-16 PROCEDURE — 3700000002 HC GENERAL ANESTHESIA TIME - EACH INCREMENTAL 1 MINUTE

## 2024-09-16 PROCEDURE — 7100000010 HC PHASE TWO TIME - EACH INCREMENTAL 1 MINUTE

## 2024-09-16 PROCEDURE — 7100000009 HC PHASE TWO TIME - INITIAL BASE CHARGE

## 2024-09-16 PROCEDURE — 99100 ANES PT EXTEME AGE<1 YR&>70: CPT | Performed by: ANESTHESIOLOGY

## 2024-09-16 PROCEDURE — 45385 COLONOSCOPY W/LESION REMOVAL: CPT | Performed by: INTERNAL MEDICINE

## 2024-09-16 PROCEDURE — A45385 PR COLONOSCOPY,REMV LESN,SNARE: Performed by: ANESTHESIOLOGY

## 2024-09-16 RX ORDER — PROPOFOL 10 MG/ML
INJECTION, EMULSION INTRAVENOUS CONTINUOUS PRN
Status: DISCONTINUED | OUTPATIENT
Start: 2024-09-16 | End: 2024-09-16

## 2024-09-16 RX ORDER — SODIUM CHLORIDE, SODIUM LACTATE, POTASSIUM CHLORIDE, CALCIUM CHLORIDE 600; 310; 30; 20 MG/100ML; MG/100ML; MG/100ML; MG/100ML
INJECTION, SOLUTION INTRAVENOUS CONTINUOUS PRN
Status: DISCONTINUED | OUTPATIENT
Start: 2024-09-16 | End: 2024-09-16

## 2024-09-16 RX ORDER — MIDAZOLAM HYDROCHLORIDE 1 MG/ML
INJECTION INTRAMUSCULAR; INTRAVENOUS AS NEEDED
Status: DISCONTINUED | OUTPATIENT
Start: 2024-09-16 | End: 2024-09-16

## 2024-09-16 SDOH — HEALTH STABILITY: MENTAL HEALTH: CURRENT SMOKER: 0

## 2024-09-16 ASSESSMENT — PAIN SCALES - GENERAL
PAINLEVEL_OUTOF10: 0 - NO PAIN

## 2024-09-16 ASSESSMENT — ENCOUNTER SYMPTOMS: CONSTITUTIONAL NEGATIVE: 1

## 2024-09-16 ASSESSMENT — PAIN - FUNCTIONAL ASSESSMENT
PAIN_FUNCTIONAL_ASSESSMENT: 0-10

## 2024-09-16 ASSESSMENT — COLUMBIA-SUICIDE SEVERITY RATING SCALE - C-SSRS
2. HAVE YOU ACTUALLY HAD ANY THOUGHTS OF KILLING YOURSELF?: NO
6. HAVE YOU EVER DONE ANYTHING, STARTED TO DO ANYTHING, OR PREPARED TO DO ANYTHING TO END YOUR LIFE?: NO
1. IN THE PAST MONTH, HAVE YOU WISHED YOU WERE DEAD OR WISHED YOU COULD GO TO SLEEP AND NOT WAKE UP?: NO

## 2024-09-16 NOTE — DISCHARGE INSTRUCTIONS

## 2024-09-16 NOTE — H&P
History Of Present Illness  Frandy Nick is a 74 y.o. male presenting with history of colon polyps here for surveillance.     Past Medical History  Past Medical History:   Diagnosis Date    Anxiety disorder, unspecified 08/31/2021    Anxiety    Gastric ulcer, unspecified as acute or chronic, without hemorrhage or perforation 08/30/2021    Gastric ulcer    Gastro-esophageal reflux disease without esophagitis 08/31/2021    Acid reflux    Migraine, unspecified, not intractable, without status migrainosus 08/31/2021    Migraine    Obstructive sleep apnea syndrome 11/15/2017    Pure hyperglyceridemia     Hypertriglyceridemia    REM sleep behavior disorder 12/15/2022    Controlled REM sleep behavior disorder    Restless legs syndrome 10/06/2022    Restless leg syndrome    Type 2 diabetes mellitus with obesity (Multi) 10/22/2018     Surgical History  Past Surgical History:   Procedure Laterality Date    KNEE SURGERY Left     arthrosocopy     Social History  He reports that he has been smoking cigars. He has been exposed to tobacco smoke. He has never used smokeless tobacco. He reports current alcohol use of about 2.0 standard drinks of alcohol per week. He reports that he does not use drugs.    Family History  No family history on file.     Allergies  Allergies   Allergen Reactions    Dulaglutide Swelling    Prednisone Unknown and Other     patient states he gets angry when he's on prednisone    Semaglutide Unknown    Venlafaxine Unknown     Review of Systems   Constitutional: Negative.         Physical Exam  Constitutional:       Appearance: Normal appearance.   Cardiovascular:      Rate and Rhythm: Normal rate and regular rhythm.   Pulmonary:      Effort: Pulmonary effort is normal.      Breath sounds: Normal breath sounds.   Abdominal:      General: Abdomen is flat.      Palpations: Abdomen is soft.   Neurological:      Mental Status: He is alert.   Psychiatric:         Mood and Affect: Mood normal.         Behavior:  "Behavior normal.         Thought Content: Thought content normal.         Judgment: Judgment normal.          Last Recorded Vitals  Blood pressure 118/75, pulse 80, temperature 36.3 °C (97.3 °F), resp. rate 10, height 1.753 m (5' 9\"), weight 101 kg (223 lb 8.7 oz), SpO2 96%.    Assessment/Plan   Colonoscopy as planned     Raul Morales, DO  "

## 2024-09-16 NOTE — ANESTHESIA PREPROCEDURE EVALUATION
Patient: Frandy Nick    Procedure Information       Date/Time: 09/16/24 1450    Scheduled providers: Raul Morales DO; Tiffanie Sweet MD; ELISE Hernandez    Procedure: COLONOSCOPY    [place] [facility] [place]                                                         Pre- Anesthesia Evaluation                                            Frandy Nick is a 74 y.o. male who presents for the above mentioned procedure due to Colon cancer screening [Z12.11]    Past Medical History:   Diagnosis Date    Anxiety disorder, unspecified 08/31/2021    Anxiety    Gastric ulcer, unspecified as acute or chronic, without hemorrhage or perforation 08/30/2021    Gastric ulcer    Gastro-esophageal reflux disease without esophagitis 08/31/2021    Acid reflux    Migraine, unspecified, not intractable, without status migrainosus 08/31/2021    Migraine    Obstructive sleep apnea syndrome 11/15/2017    Pure hyperglyceridemia     Hypertriglyceridemia    REM sleep behavior disorder 12/15/2022    Controlled REM sleep behavior disorder    Restless legs syndrome 10/06/2022    Restless leg syndrome    Type 2 diabetes mellitus with obesity (Multi) 10/22/2018     Past Surgical History:   Procedure Laterality Date    KNEE SURGERY Left     arthrosocopy     Social History   He reports that he has been smoking cigars. He has been exposed to tobacco smoke. He has never used smokeless tobacco. He reports current alcohol use of about 2.0 standard drinks of alcohol per week. He reports that he does not use drugs.    Allergies and Medications   Allergies   Allergen Reactions    Dulaglutide Swelling    Prednisone Unknown and Other     patient states he gets angry when he's on prednisone    Semaglutide Unknown    Venlafaxine Unknown     Current Outpatient Medications   Medication Instructions    albuterol 90 mcg/actuation inhaler 2 puffs, inhalation, Every 6 hours PRN    azelastine (Astelin) 137 mcg (0.1 %) nasal spray 1 spray, nasal, 2 times  "daily    clonazePAM (KLONOPIN) 2 mg, oral, 2 times daily PRN    clopidogrel (PLAVIX) 75 mg, oral, Daily    esomeprazole (NEXIUM) 20 mg, oral, Daily before breakfast    Farxiga 10 mg, oral, Daily before breakfast    FreeStyle Lorenzo reader (FreeStyle Lorenzo 2 [place]) misc 1 each, subcutaneous, As needed, Use as instructed    insulin degludec (Tresiba FlexTouch U-100) 100 unit/mL (3 mL) injection INJECT 15 UNITS SUBCUTANEOUSLY AT BEDTIME    metFORMIN (Glucophage) 1,000 mg tablet Take 1 tab in the morning, 2 tabs in the evening.    pen needle, diabetic (BD Ultra-Fine Mini Pen Needle) 31 gauge x 3/16\" needle USE WITH INSULIN NIGHTLY    pramipexole (MIRAPEX) 0.125 mg, oral, 3 times daily    rosuvastatin (CRESTOR) 20 mg, oral, Daily    SUMAtriptan (Imitrex) 5 mg/actuation nasal spray 1 spray, nasal, Every 2 hour PRN    tadalafil (CIALIS) 5 mg, oral, Daily    tadalafil 20 mg tablet oral       Recent Labs     06/22/23  1634 12/10/22  1754   WBC 8.1 6.3   HGB 15.4 14.5   HCT 50.0 43.0    225   MCV 96 91     Recent Labs     06/25/24  1315 03/25/24  0749 06/22/23  1634   EGFR >90 66  --    ANIONGAP 14 15 12   BUN 14 13 18   CREATININE 0.90 1.18 0.96    140 139   K 4.0 4.5 4.4    103 102   CO2 24 27 29   GLUCOSE 92 139* 96     Recent Labs     06/25/24  1315 03/25/24  0749   PROT 6.3* 6.7   ALBUMIN 4.1 4.1   BILITOT 0.4 0.4   ALKPHOS 69 78   ALT 12 15   AST 13 12     Recent Labs     06/22/23  1634   LIPASE 34   AMYLASE 102     Recent Labs     06/25/24  1315   HGBA1C 6.0*     Recent Labs     06/25/24  1315 03/25/24  0749   CALCIUM 9.2 9.5           Visit Vitals  /75   Pulse 80   Temp 36.3 °C (97.3 °F)   Resp 10   Ht 1.753 m (5' 9\")   Wt 101 kg (223 lb 8.7 oz)   SpO2 96%   BMI 33.01 kg/m²   Smoking Status Some Days   BSA 2.22 m²     Medical Gas Therapy: None (Room air)             Code Status: Assume Full             Relevant Problems   Cardiac   (+) Hypertriglyceridemia      Pulmonary   (+) Obstructive " sleep apnea syndrome      Neuro   (+) Anxiety   (+) Major depressive disorder with single episode, in partial remission (CMS-HCC)      GI   (+) Gastric ulcer   (+) Gastroesophageal reflux disease      /Renal   (+) BPH with obstruction/lower urinary tract symptoms      Endocrine   (+) Type 2 diabetes mellitus with obesity (Multi)      Musculoskeletal   (+) Herniation of lumbar intervertebral disc without myelopathy       Clinical information reviewed:   Tobacco  Allergies  Meds  Problems  Med Hx  Surg Hx   Fam Hx  Soc   Hx        NPO Detail:  NPO/Void Status  Date of Last Liquid: 09/15/24  Time of Last Liquid: 0000  Date of Last Solid: 09/15/24  Time of Last Solid: 0000  Last Intake Type: Clear fluids         Physical Exam    Airway  Mallampati: II  TM distance: >3 FB  Neck ROM: full     Cardiovascular   Rhythm: regular  Rate: normal     Dental - normal exam     Pulmonary   Comments: Normal RR  Non-labored respiration    Abdominal            Anesthesia Plan    History of general anesthesia?: yes  History of complications of general anesthesia?: no    ASA 3     MAC   (With standard ASA monitoring.)  The patient is not a current smoker.  Education provided regarding risk of obstructive sleep apnea.  intravenous induction   Anesthetic plan and risks discussed with patient.    Plan discussed with CAA.

## 2024-09-16 NOTE — ANESTHESIA POSTPROCEDURE EVALUATION
Patient: Frandy Nick    Procedure Summary       Date: 09/16/24 Room / Location: [facility]    Anesthesia Start: 1603 Anesthesia Stop: 1634    Procedure: COLONOSCOPY Diagnosis: Colon cancer screening    Scheduled Providers: Raul Morales DO; Tiffanie Sweet MD; ELISE Hernandez Responsible Provider: Elvin Herrera MD    Anesthesia Type: MAC ASA Status: 3            Anesthesia Type: MAC    Vitals Value Taken Time   /66 09/16/24 1647   Temp 36.3 °C (97.3 °F) 09/16/24 1630   Pulse 69 09/16/24 1657   Resp 16 09/16/24 1645   SpO2 99 % 09/16/24 1657   Vitals shown include unfiled device data.    Anesthesia Post Evaluation    Patient participation: complete - patient participated  Level of consciousness: awake  Pain management: satisfactory to patient  Airway patency: patent  Cardiovascular status: acceptable and hemodynamically stable  Respiratory status: acceptable and nonlabored ventilation  Hydration status: balanced  Postoperative Nausea and Vomiting: none        No notable events documented.

## 2024-09-23 LAB
LABORATORY COMMENT REPORT: NORMAL
PATH REPORT.FINAL DX SPEC: NORMAL
PATH REPORT.GROSS SPEC: NORMAL
PATH REPORT.RELEVANT HX SPEC: NORMAL
PATH REPORT.TOTAL CANCER: NORMAL

## 2024-10-02 ENCOUNTER — TELEPHONE (OUTPATIENT)
Dept: ENDOCRINOLOGY | Facility: CLINIC | Age: 74
End: 2024-10-02
Payer: MEDICARE

## 2024-10-02 NOTE — TELEPHONE ENCOUNTER
Last office visit:  08/19/24  Next office visit:  01/20/25    Received faxed request from [facility] on 10/02 for most recent office note.    Note from 8/19 faxed to [phone] on 10/02

## 2024-10-08 ENCOUNTER — APPOINTMENT (OUTPATIENT)
Dept: DERMATOLOGY | Facility: CLINIC | Age: 74
End: 2024-10-08
Payer: MEDICARE

## 2024-10-08 DIAGNOSIS — L72.3 SEBACEOUS CYST: Primary | ICD-10-CM

## 2024-10-08 DIAGNOSIS — L57.0 ACTINIC KERATOSIS: ICD-10-CM

## 2024-10-08 DIAGNOSIS — D18.01 CHERRY ANGIOMA: ICD-10-CM

## 2024-10-08 PROCEDURE — 3044F HG A1C LEVEL LT 7.0%: CPT | Performed by: DERMATOLOGY

## 2024-10-08 PROCEDURE — 3048F LDL-C <100 MG/DL: CPT | Performed by: DERMATOLOGY

## 2024-10-08 PROCEDURE — 1159F MED LIST DOCD IN RCRD: CPT | Performed by: DERMATOLOGY

## 2024-10-08 PROCEDURE — 3062F POS MACROALBUMINURIA REV: CPT | Performed by: DERMATOLOGY

## 2024-10-08 PROCEDURE — 17000 DESTRUCT PREMALG LESION: CPT | Performed by: DERMATOLOGY

## 2024-10-08 PROCEDURE — 99214 OFFICE O/P EST MOD 30 MIN: CPT | Performed by: DERMATOLOGY

## 2024-10-08 PROCEDURE — 17003 DESTRUCT PREMALG LES 2-14: CPT | Performed by: DERMATOLOGY

## 2024-10-08 RX ORDER — CLINDAMYCIN PHOSPHATE 10 UG/ML
LOTION TOPICAL DAILY
Qty: 60 ML | Refills: 3 | Status: SHIPPED | OUTPATIENT
Start: 2024-10-08 | End: 2025-10-08

## 2024-10-08 NOTE — PROGRESS NOTES
Subjective     Frandy Nick is a 74 y.o. male with no history of skin cancer who presents for the following: Suspicious Skin Lesion (Lump on right side of scalp (has since gotten smaller in the last two days) and right inner thigh ). He has two other spots he also noticed mild scaling on the scalp and postauricular skin (left side). Otherwise, no other lesions of concern. He has not tried any treatments for these areas yet.    Review of Systems:  No other skin or systemic complaints other than what is documented elsewhere in the note.    The following portions of the chart were reviewed this encounter and updated as appropriate:          Skin Cancer History  No skin cancer on file.      Specialty Problems          Dermatology Problems    Actinic keratosis    Follicular disorder, unspecified    Folliculitis    Verruca        Objective   Well appearing patient in no apparent distress; mood and affect are within normal limits.    A focused skin examination was performed. All findings within normal limits unless otherwise noted below.    Assessment/Plan   1. Sebaceous cyst (2)  Pubic, Right Parietal Scalp  Cystic smooth nodules    - Reassured on benign nature of these lesions  - Recommend starting clindamycin lotion to these areas daily    Related Medications  clindamycin (Cleocin T) 1 % lotion  Apply topically once daily. 60g    2. Actinic keratosis (3)  Left Postauricular Area, Mid Parietal Scalp (2)  Erythematous macules with gritty scale.    Destr of lesion - Left Postauricular Area, Mid Parietal Scalp (2)  Complexity: simple    Destruction method: cryotherapy    Informed consent: discussed and consent obtained    Lesion destroyed using liquid nitrogen: Yes    Region frozen until ice ball extended beyond lesion: Yes    Cryotherapy cycles:  1  Outcome: patient tolerated procedure well with no complications    Post-procedure details: wound care instructions given      3. Cherry angioma  - scattered small bright red  papules and macules    Cherry angioma(s)  - Discussed benign nature and that no treatment is necessary unless it becomes painful or increases in size. Patient opts for clinical monitoring at this time.        Follow up in 6 months or earlier as needed.    Woody Toro MD  Department of Dermatology      I saw and evaluated the patient, participating in the key elements of the service.  I discussed the findings, assessment and plan with the resident and agree with resident’s findings and plan as documented in the resident's note.  I was immediately available for the entirety of the procedure(s) and present for the key and critical portions.     Gabino Forte MD PhD

## 2024-10-09 DIAGNOSIS — N40.1 BPH WITH OBSTRUCTION/LOWER URINARY TRACT SYMPTOMS: ICD-10-CM

## 2024-10-09 DIAGNOSIS — N13.8 BPH WITH OBSTRUCTION/LOWER URINARY TRACT SYMPTOMS: ICD-10-CM

## 2024-10-10 RX ORDER — TADALAFIL 5 MG/1
5 TABLET ORAL DAILY
Qty: 90 TABLET | Refills: 0 | Status: SHIPPED | OUTPATIENT
Start: 2024-10-10

## 2024-10-14 ENCOUNTER — OFFICE VISIT (OUTPATIENT)
Dept: UROLOGY | Facility: HOSPITAL | Age: 74
End: 2024-10-14
Payer: MEDICARE

## 2024-10-14 DIAGNOSIS — N52.1 ERECTILE DYSFUNCTION ASSOCIATED WITH TYPE 2 DIABETES MELLITUS (MULTI): ICD-10-CM

## 2024-10-14 DIAGNOSIS — N40.1 BPH WITH OBSTRUCTION/LOWER URINARY TRACT SYMPTOMS: Primary | ICD-10-CM

## 2024-10-14 DIAGNOSIS — E11.69 ERECTILE DYSFUNCTION ASSOCIATED WITH TYPE 2 DIABETES MELLITUS (MULTI): ICD-10-CM

## 2024-10-14 DIAGNOSIS — N13.8 BPH WITH OBSTRUCTION/LOWER URINARY TRACT SYMPTOMS: Primary | ICD-10-CM

## 2024-10-14 DIAGNOSIS — L72.9 SCROTAL CYST: ICD-10-CM

## 2024-10-14 PROCEDURE — 3044F HG A1C LEVEL LT 7.0%: CPT | Performed by: NURSE PRACTITIONER

## 2024-10-14 PROCEDURE — 1159F MED LIST DOCD IN RCRD: CPT | Performed by: NURSE PRACTITIONER

## 2024-10-14 PROCEDURE — 1126F AMNT PAIN NOTED NONE PRSNT: CPT | Performed by: NURSE PRACTITIONER

## 2024-10-14 PROCEDURE — 1160F RVW MEDS BY RX/DR IN RCRD: CPT | Performed by: NURSE PRACTITIONER

## 2024-10-14 PROCEDURE — 99214 OFFICE O/P EST MOD 30 MIN: CPT | Performed by: NURSE PRACTITIONER

## 2024-10-14 PROCEDURE — G2211 COMPLEX E/M VISIT ADD ON: HCPCS | Performed by: NURSE PRACTITIONER

## 2024-10-14 PROCEDURE — 51798 US URINE CAPACITY MEASURE: CPT | Performed by: NURSE PRACTITIONER

## 2024-10-14 PROCEDURE — 3062F POS MACROALBUMINURIA REV: CPT | Performed by: NURSE PRACTITIONER

## 2024-10-14 PROCEDURE — 3048F LDL-C <100 MG/DL: CPT | Performed by: NURSE PRACTITIONER

## 2024-10-14 RX ORDER — TADALAFIL 20 MG/1
20 TABLET ORAL DAILY PRN
Qty: 10 TABLET | Refills: 1 | Status: SHIPPED | OUTPATIENT
Start: 2024-10-14

## 2024-10-14 RX ORDER — TADALAFIL 5 MG/1
5 TABLET ORAL DAILY
Qty: 90 TABLET | Refills: 3 | Status: SHIPPED | OUTPATIENT
Start: 2024-10-14 | End: 2025-10-14

## 2024-10-14 ASSESSMENT — PAIN SCALES - GENERAL: PAINLEVEL: 0-NO PAIN

## 2024-10-14 NOTE — PROGRESS NOTES
[facility]  Outpatient Clinic Note    Patient Name:  Frandy Nick  MRN:  [id number]  :  1950  Date of Service: 10/14/2024     Visit type: Follow up visit    HPI    Interval History:  Frandy Nick is a 74 y.o. male who is being seen today for  problems listed below.     Problem list/Chief complaints:  BPH with obstruction/lower urinary tract symptoms-taking tadalafil 5 mg daily  ED-taking tadalafil 20 mg as needed        8/3/2023: Follow-up visit with Tg Ellis CNP. 73-year-old gentleman presents for follow-up. He has a history of DM2. He was previously seen for urinary frequency and urgency. These symptoms have improved as his blood sugars improved. He is taking Tadalafil 20mg PRN. He has no complaints today. He denies any dysuria, gross hematuria, flank pain, fevers or chills. Otherwise, reports he has been doing well. He is a nonsmoker.  1) BPH with Urinary urgency with UUI  - Likely related to DM, resolved  - PSA WNL 0.53 from 2022, due now  2) ED  - Continue with low-dose Tadalafil  - Increase to 20mg PRN  - Refilled today, [facility] on [address]   -Follow-up 1 year, or sooner if needed.     10/14/24: Patient states he's doing well. He is taking Tadalafil 5 mg daily for BPH, he denies dysuria, no frequency or urgency, nocturia x 1, no hematuria, no fever or chills. PVR 0 ml. Tadalafil does not work very well for his erections as he has decreased sensation but he does not need to take it often. Patient has a right scrotal cyst that he would like removed. He states it was reddened and swollen, he has been using clindamycin lotion as recommended by his dermatologist, which has resolved the swelling and redness.    Past Medical History:   Diagnosis Date    Anxiety disorder, unspecified 2021    Anxiety    Gastric ulcer, unspecified as acute or chronic, without hemorrhage or perforation 2021    Gastric ulcer    Gastro-esophageal reflux disease without esophagitis 2021     Acid reflux    Migraine, unspecified, not intractable, without status migrainosus 08/31/2021    Migraine    Obstructive sleep apnea syndrome 11/15/2017    Pure hyperglyceridemia     Hypertriglyceridemia    REM sleep behavior disorder 12/15/2022    Controlled REM sleep behavior disorder    Restless legs syndrome 10/06/2022    Restless leg syndrome    Type 2 diabetes mellitus with obesity (Multi) 10/22/2018       Past Surgical History:   Procedure Laterality Date    KNEE SURGERY Left     arthrosocopy       Social History     Socioeconomic History    Marital status:      Spouse name: Not on file    Number of children: Not on file    Years of education: Not on file    Highest education level: Not on file   Occupational History    Not on file   Tobacco Use    Smoking status: Some Days     Types: Cigars     Passive exposure: Past    Smokeless tobacco: Never    Tobacco comments:     Nicoreete gum   Vaping Use    Vaping status: Never Used   Substance and Sexual Activity    Alcohol use: Yes     Alcohol/week: 2.0 standard drinks of alcohol     Types: 2 Shots of liquor per week     Comment: 2/3 in a week    Drug use: Never    Sexual activity: Yes   Other Topics Concern    Not on file   Social History Narrative    Not on file     Social Determinants of Health     Financial Resource Strain: Low Risk  (6/22/2023)    Overall Financial Resource Strain (CARDIA)     Difficulty of Paying Living Expenses: Not very hard   Food Insecurity: No Food Insecurity (6/22/2023)    Hunger Vital Sign     Worried About Running Out of Food in the Last Year: Never true     Ran Out of Food in the Last Year: Never true   Transportation Needs: No Transportation Needs (6/22/2023)    PRAPARE - Transportation     Lack of Transportation (Medical): No     Lack of Transportation (Non-Medical): No   Physical Activity: Sufficiently Active (6/22/2023)    Exercise Vital Sign     Days of Exercise per Week: 5 days     Minutes of Exercise per Session: 50  min   Stress: No Stress Concern Present (6/22/2023)    [place] [employer] - Occupational Stress Questionnaire     Feeling of Stress : Not at all   Social Connections: Unknown (6/22/2023)    Social Connection and Isolation Panel [NHANES]     Frequency of Communication with Friends and Family: Three times a week     Frequency of Social Gatherings with Friends and Family: Three times a week     Attends [employer] Services: More than 4 times per year     Active Member of Clubs or Organizations: Patient declined     Attends Club or Organization Meetings: Patient declined     Marital Status: Patient declined   Intimate Partner Violence: Not At Risk (6/22/2023)    Humiliation, Afraid, Rape, and Kick questionnaire     Fear of Current or Ex-Partner: No     Emotionally Abused: No     Physically Abused: No     Sexually Abused: No   Housing Stability: Unknown (6/22/2023)    Housing Stability Vital Sign     Unable to Pay for Housing in the Last Year: No     Number of Places Lived in the Last Year: Not on file     Unstable Housing in the Last Year: No       Allergies   Allergen Reactions    Dulaglutide Swelling    Prednisone Unknown and Other     patient states he gets angry when he's on prednisone    Semaglutide Unknown    Venlafaxine Unknown          Current Outpatient Medications:     albuterol 90 mcg/actuation inhaler, Inhale 2 puffs every 6 hours if needed., Disp: , Rfl:     azelastine (Astelin) 137 mcg (0.1 %) nasal spray, Administer 1 spray into affected nostril(s) twice a day., Disp: , Rfl:     clindamycin (Cleocin T) 1 % lotion, Apply topically once daily. 60g, Disp: 60 mL, Rfl: 3    clonazePAM (KlonoPIN) 2 mg tablet, Take 1 tablet (2 mg) by mouth 2 times a day as needed., Disp: , Rfl:     clopidogrel (Plavix) 75 mg tablet, Take 1 tablet (75 mg) by mouth once daily., Disp: 90 tablet, Rfl: 3    esomeprazole (NexIUM) 20 mg DR capsule, Take 1 capsule (20 mg) by mouth once daily in the morning. Take  "before meals., Disp: , Rfl:     Farxiga 10 mg, Take 1 tablet (10 mg) by mouth once daily in the morning. Take before meals., Disp: 90 tablet, Rfl: 0    FreeStyle Lorenzo reader (FreeStyle Lorenzo 2 [place]) misc, Inject 1 each under the skin if needed. Use as instructed, Disp: , Rfl:     insulin degludec (Tresiba FlexTouch U-100) 100 unit/mL (3 mL) injection, INJECT 15 UNITS SUBCUTANEOUSLY AT BEDTIME, Disp: 15 mL, Rfl: 3    metFORMIN (Glucophage) 1,000 mg tablet, Take 1 tab in the morning, 2 tabs in the evening., Disp: 180 tablet, Rfl: 3    pen needle, diabetic (BD Ultra-Fine Mini Pen Needle) 31 gauge x 3/16\" needle, USE WITH INSULIN NIGHTLY, Disp: 100 each, Rfl: 3    pramipexole (Mirapex) 0.125 mg tablet, Take 1 tablet (0.125 mg) by mouth 3 times a day., Disp: , Rfl:     rosuvastatin (Crestor) 20 mg tablet, TAKE 1 TABLET BY MOUTH EVERY DAY, Disp: 90 tablet, Rfl: 3    SUMAtriptan (Imitrex) 5 mg/actuation nasal spray, Administer 1 spray (5 mg) into affected nostril(s) every 2 hours if needed., Disp: , Rfl:     tadalafil (Cialis) 5 mg tablet, Take 1 tablet (5 mg) by mouth once daily., Disp: 90 tablet, Rfl: 3    tadalafil 20 mg tablet, Take 1 tablet (20 mg) by mouth once daily as needed for erectile dysfunction., Disp: 10 tablet, Rfl: 1     Review of system:  All other systems have been reviewed and are negative for complaints      Last recorded vitals:  There were no vitals taken for this visit.    Physical Exam:Chaperone offered, patient declined.  General: Appears comfortable and in no apparent distress.  Head: Normocephalic, atraumatic  Eyes: Non-injected conjunctiva, sclera clear, no proptosis  Lungs: Breathing is easy, non-labored. Speaking in clear and complete sentences. Normal diaphragmatic movement.  Cardiovascular: no peripheral edema, cyanosis or pallor.   Abdomen: soft, non-distended, non-tender  : Bladder: non tender, not distended; small cyst noted on right scrotum, no s/sx of infection, non tender.  MSK: " Ambulatory with steady gait, unassisted  Skin: No visible rashes or lesions  Neurologic: Alert, oriented to person, place, and time  Psychiatric: mood and affect appropriate        Labs  Lab Results   Component Value Date    WBC 8.1 06/22/2023    HGB 15.4 06/22/2023    HCT 50.0 06/22/2023    MCV 96 06/22/2023     06/22/2023     Lab Results   Component Value Date    GLUCOSE 92 06/25/2024    CALCIUM 9.2 06/25/2024     06/25/2024    K 4.0 06/25/2024    CO2 24 06/25/2024     06/25/2024    BUN 14 06/25/2024    CREATININE 0.90 06/25/2024       Assessment and Plan:  Frandy Nick is a 74 y.o. male with history of BPH with obstruction/lower urinary tract symptoms, ED, who presents for follow-up.     Plan:  -PVR 0 ml  -Discussed the risks and benefit of continuing Tadalafil, medication is working well for patient with no side effects. Discussed other management options. The patient and I agreed to continue with medication.  -Refill for Tadalafil 5 mg daily sent to pharmacy. Patient will use [facility]  -Paper prescription for Tadalafil 20 mg as needed provided to patient.  -Referral to Dr. Hammond for scrotal cyst lancing placed per patient's request  -Follow-up in 1 year, or sooner if needed, to reassess symptoms and for medication refill.    All questions and concerns were addressed. Patient verbalizes understanding and has no other questions at this time.     Some elements copied from Tg Ellis's, CNP note on 8/3/2023, the elements have been updated and all reflect current decision making from today, 10/14/24    E&M visit today is associated with current or anticipated ongoing medical care services related to a patient's single, serious condition or a complex condition.    MARY Pelayo-CNP   [facility]  10/14/24 10:52 AM

## 2024-11-05 DIAGNOSIS — Z23 NEED FOR VACCINATION: Primary | ICD-10-CM

## 2024-11-07 DIAGNOSIS — Z29.11 NEED FOR RSV VACCINATION: Primary | ICD-10-CM

## 2024-11-07 RX ORDER — RSV VACC, PREF A AND PREF B/PF 120MCG/0.5
0.5 VIAL (EA) INTRAMUSCULAR ONCE
Qty: 1 EACH | Refills: 0 | Status: SHIPPED | OUTPATIENT
Start: 2024-11-07 | End: 2024-11-07

## 2024-11-08 DIAGNOSIS — E11.65 TYPE 2 DIABETES MELLITUS WITH HYPERGLYCEMIA, WITH LONG-TERM CURRENT USE OF INSULIN: ICD-10-CM

## 2024-11-08 DIAGNOSIS — Z79.4 TYPE 2 DIABETES MELLITUS WITH HYPERGLYCEMIA, WITH LONG-TERM CURRENT USE OF INSULIN: ICD-10-CM

## 2024-11-08 RX ORDER — INSULIN DEGLUDEC 100 U/ML
INJECTION, SOLUTION SUBCUTANEOUS
Qty: 15 ML | Refills: 1 | Status: SHIPPED | OUTPATIENT
Start: 2024-11-08

## 2024-11-08 RX ORDER — INSULIN DEGLUDEC 100 U/ML
INJECTION, SOLUTION SUBCUTANEOUS
Qty: 15 ML | Refills: 1 | Status: CANCELLED | OUTPATIENT
Start: 2024-11-08

## 2024-11-14 ENCOUNTER — OFFICE VISIT (OUTPATIENT)
Dept: UROLOGY | Facility: HOSPITAL | Age: 74
End: 2024-11-14
Payer: MEDICARE

## 2024-11-14 DIAGNOSIS — L72.9 SCROTAL CYST: Primary | ICD-10-CM

## 2024-11-14 PROCEDURE — 10060 I&D ABSCESS SIMPLE/SINGLE: CPT | Performed by: UROLOGY

## 2024-11-14 PROCEDURE — 3044F HG A1C LEVEL LT 7.0%: CPT | Performed by: UROLOGY

## 2024-11-14 PROCEDURE — 3048F LDL-C <100 MG/DL: CPT | Performed by: UROLOGY

## 2024-11-14 PROCEDURE — 1159F MED LIST DOCD IN RCRD: CPT | Performed by: UROLOGY

## 2024-11-14 PROCEDURE — 3062F POS MACROALBUMINURIA REV: CPT | Performed by: UROLOGY

## 2024-11-14 PROCEDURE — 99214 OFFICE O/P EST MOD 30 MIN: CPT | Performed by: UROLOGY

## 2024-11-14 RX ORDER — CEPHALEXIN 500 MG/1
500 CAPSULE ORAL 2 TIMES DAILY
Qty: 6 CAPSULE | Refills: 0 | Status: SHIPPED | OUTPATIENT
Start: 2024-11-14 | End: 2024-11-17

## 2024-11-14 NOTE — PROGRESS NOTES
NPV    Referred by Dorita Chanel for scrotal cyst     HISTORY OF PRESENT ILLNESS:   Frandy Nick is a 74 y.o. male who is being seen today for consult on scrotal cyst    Has painful cyst in groin crease.  No f/c.  Urinating ok    PAST MEDICAL HISTORY:  Past Medical History:   Diagnosis Date    Anxiety disorder, unspecified 08/31/2021    Anxiety    Gastric ulcer, unspecified as acute or chronic, without hemorrhage or perforation 08/30/2021    Gastric ulcer    Gastro-esophageal reflux disease without esophagitis 08/31/2021    Acid reflux    Migraine, unspecified, not intractable, without status migrainosus 08/31/2021    Migraine    Obstructive sleep apnea syndrome 11/15/2017    Pure hyperglyceridemia     Hypertriglyceridemia    REM sleep behavior disorder 12/15/2022    Controlled REM sleep behavior disorder    Restless legs syndrome 10/06/2022    Restless leg syndrome    Type 2 diabetes mellitus with obesity (Multi) 10/22/2018       PAST SURGICAL HISTORY:  Past Surgical History:   Procedure Laterality Date    KNEE SURGERY Left     arthrosocopy        ALLERGIES:   Allergies   Allergen Reactions    Dulaglutide Swelling    Prednisone Unknown and Other     patient states he gets angry when he's on prednisone    Semaglutide Unknown    Venlafaxine Unknown        MEDICATIONS:   Current Outpatient Medications   Medication Instructions    albuterol 90 mcg/actuation inhaler 2 puffs, inhalation, Every 6 hours PRN    azelastine (Astelin) 137 mcg (0.1 %) nasal spray 1 spray, nasal, 2 times daily    clindamycin (Cleocin T) 1 % lotion Topical, Daily, 60g    clonazePAM (KLONOPIN) 2 mg, oral, 2 times daily PRN    clopidogrel (PLAVIX) 75 mg, oral, Daily    esomeprazole (NEXIUM) 20 mg, oral, Daily before breakfast    Farxiga 10 mg, oral, Daily before breakfast    FreeStyle Lorenzo reader (FreeStyle Lorenzo 2 [place]) misc 1 each, subcutaneous, As needed, Use as instructed    insulin degludec (Tresiba FlexTouch U-100) 100 unit/mL (3 mL)  "injection INJECT 15 UNITS SUBCUTANEOUSLY AT BEDTIME    metFORMIN (Glucophage) 1,000 mg tablet Take 1 tab in the morning, 2 tabs in the evening.    pen needle, diabetic (BD Ultra-Fine Mini Pen Needle) 31 gauge x 3/16\" needle USE WITH INSULIN NIGHTLY    pramipexole (MIRAPEX) 0.125 mg, oral, 3 times daily    rosuvastatin (CRESTOR) 20 mg, oral, Daily    SUMAtriptan (Imitrex) 5 mg/actuation nasal spray 1 spray, nasal, Every 2 hour PRN    tadalafil (CIALIS) 5 mg, oral, Daily    tadalafil (CIALIS) 20 mg, oral, Daily PRN        PHYSICAL EXAM:  There were no vitals taken for this visit.  Constitutional: Patient appears well-developed and well-nourished. No distress.    Pulmonary/Chest: Effort normal. No respiratory distress.   Abdominal: Soft, ND NT  : 1.5cm indurated cyst in right groin crease  Musculoskeletal: Normal range of motion.    Neurological: Alert and oriented to person, place, and time.  Psychiatric: Normal mood and affect. Behavior is normal. Thought content normal.      Labs  Lab Results   Component Value Date    GFRMALE 84 06/22/2023     Lab Results   Component Value Date    CREATININE 0.90 06/25/2024     Lab Results   Component Value Date    CHOL 142 03/25/2024     Lab Results   Component Value Date    HDL 44.3 03/25/2024     Lab Results   Component Value Date    CHHDL 3.2 03/25/2024     Lab Results   Component Value Date    LDLF 77 03/29/2023     Lab Results   Component Value Date    VLDL 29 03/25/2024     Lab Results   Component Value Date    TRIG 145 03/25/2024     Lab Results   Component Value Date    HGBA1C 6.0 (H) 06/25/2024     Lab Results   Component Value Date    HCT 50.0 06/22/2023     Procedures  Informed consent obtained.  Groin prepped and drapped in sterile fashion.  6cc 1% lidocaine instilled around cyst.  1.5cm incision made over cyst.  Sebaceous cyst opened and material drained.  Minimal bleeding.      Assessment:      1. Scrotal cyst            Frandy Nick is a 74 y.o. male with 1.5cm " sebacous cyst in groin.  Incised and drained in office     Plan:   1)  Discussed local wound care  2) keflex for 3days  3) Warning signs discussed    FU with any issues or concerns

## 2024-12-19 DIAGNOSIS — E11.65 TYPE 2 DIABETES MELLITUS WITH HYPERGLYCEMIA, WITH LONG-TERM CURRENT USE OF INSULIN: ICD-10-CM

## 2024-12-19 DIAGNOSIS — Z79.4 TYPE 2 DIABETES MELLITUS WITH HYPERGLYCEMIA, WITH LONG-TERM CURRENT USE OF INSULIN: ICD-10-CM

## 2024-12-19 RX ORDER — DAPAGLIFLOZIN 10 MG/1
10 TABLET, FILM COATED ORAL
Qty: 90 TABLET | Refills: 1 | Status: SHIPPED | OUTPATIENT
Start: 2024-12-19

## 2025-01-06 ENCOUNTER — TELEPHONE (OUTPATIENT)
Dept: ENDOCRINOLOGY | Facility: CLINIC | Age: 75
End: 2025-01-06
Payer: MEDICARE

## 2025-01-06 NOTE — TELEPHONE ENCOUNTER
Received/Initiated a CMN from/for [facility] on 01/03/25  For Lorenzo ARMIJO    Faxed/emailed to:  [phone] on 01/06/25

## 2025-01-16 NOTE — PROGRESS NOTES
FUV for diabetes. LV with me 08/2024.    History of Present Illness  74 y.o. male with hx of type 2 diabetes, obesity class I, HLD.     Dx: in 60s  HbA1c: 5.9% (01/2025), 6.0% (6/25/2024), 6.2% (03/2024), 6.2% (11/15/2023), 6.3% (7/5/2023), 6.6% (03/2023), 13.2% (12/2022)  Current regimen: metformin 500-1000 mg BID, Tresiba 15 units QHS, Farxiga 10 mg QDAY  Past medications: Ozempic (pancreatitis), Trulicity, Victoza  Complications: none  Comorbidities: obesity, HLD     SMBG: Lorenzo 2     Hypoglycemia: no    Today:  -intermittent bouts of nausea,  diarrhea    ROS  General: no fever or chills  CV: no chest pain   Respiratory: no shortness of breath  MSK: no lower extremity edema  Neuro: no headache or dizziness  See HPI for Endocrine ROS    Physical Exam   weight is 106 kg (234 lb). His blood pressure is 128/75 and his pulse is 79.   General: not in acute distress  HEENT: MYKEL, EOMI  Thyroid: no goiter  Neuro: alert and oriented x 3      Current Outpatient Medications   Medication Sig Dispense Refill    albuterol 90 mcg/actuation inhaler Inhale 2 puffs every 6 hours if needed.      azelastine (Astelin) 137 mcg (0.1 %) nasal spray Administer 1 spray into affected nostril(s) twice a day.      clindamycin (Cleocin T) 1 % lotion Apply topically once daily. 60g 60 mL 3    clonazePAM (KlonoPIN) 2 mg tablet Take 1 tablet (2 mg) by mouth 2 times a day as needed.      clopidogrel (Plavix) 75 mg tablet Take 1 tablet (75 mg) by mouth once daily. 90 tablet 3    esomeprazole (NexIUM) 20 mg DR capsule Take 1 capsule (20 mg) by mouth once daily in the morning. Take before meals.      Farxiga 10 mg TAKE 1 TABLET (10 MG) BY MOUTH ONCE DAILY IN THE MORNING. TAKE BEFORE MEALS. 90 tablet 1    FreeStyle Lorenzo reader (FreeStyle Lorenzo 2 [place]) misc Inject 1 each under the skin if needed. Use as instructed      insulin degludec (Tresiba FlexTouch U-100) 100 unit/mL (3 mL) injection INJECT 15 UNITS SUBCUTANEOUSLY AT BEDTIME 15 mL 1     "metFORMIN (Glucophage) 1,000 mg tablet Take 1 tab in the morning, 2 tabs in the evening. 180 tablet 3    pen needle, diabetic (BD Ultra-Fine Mini Pen Needle) 31 gauge x 3/16\" needle USE WITH INSULIN NIGHTLY 100 each 3    pramipexole (Mirapex) 0.125 mg tablet Take 1 tablet (0.125 mg) by mouth 3 times a day.      rosuvastatin (Crestor) 20 mg tablet TAKE 1 TABLET BY MOUTH EVERY DAY 90 tablet 3    tadalafil (Cialis) 5 mg tablet Take 1 tablet (5 mg) by mouth once daily. 90 tablet 3    tadalafil 20 mg tablet Take 1 tablet (20 mg) by mouth once daily as needed for erectile dysfunction. 10 tablet 1    SUMAtriptan (Imitrex) 5 mg/actuation nasal spray Administer 1 spray (5 mg) into affected nostril(s) every 2 hours if needed. (Patient not taking: Reported on 1/20/2025)       No current facility-administered medications for this visit.         Assessment and Plan  74 y.o. male with hx of type 2 diabetes, obesity class I, HLD, here for management of diabetes and medication side effects.     1. Type 2 diabetes mellitus  2. Obesity class I  HbA1c: 5.9% (01/2025), 6.0% (6/25/2024), 6.2% (03/2024), 6.2% (11/15/2023), 6.3% (7/5/2023), 6.6% (3/29/2023), 13.2% (12/2022), 6.7% (07/2022), 6.3% (08/2021)  Current regimen: metformin 500-1000 mg BID, Tresiba 15 units QHS, Farxiga 10 mg QDAY  Eye exam: no DR (03/2024)-scheduled for 3/13/2025  Urine microalbumin: neg (01/2025)  Podiatry: foot exam done 8/19/2024  Lipids: HDL 41, LDL 79,  (01/2025) on rosuvastatin 20 mg  *Avoid GLP-1RA-pancreatitis on Ozempic    A1c: 5.9%.  Lorenzo download reviewed.    Having hypoglycemia occasionally during the day.  He reports having intermittent nausea and diarrhea for the past 3 months which has affected his appetite. He is not eating much during the day (eats one main meal per day which is dinner).  Will decrease Tresiba slightly. He reports that AM BG is sometimes in the 120s and is hesitant to decrease Tresiba. I told him that preventing " hypoglycemia is very important; I recommended he has a small snack midday, but if AM BG is persistently elevated, he can resume 15 units.     He has chronic GI issues with bloating, diarrhea, abdominal tenderness for years but symptoms had been minimal until the past 3 months again.  He reported worsening fatigue, nausea, bloating since starting Basaglar. Symptoms improved with dose reduction. Reports that Tresiba has caused the least degree of side effects by far and he is tolerating this.    He used to have a GI provider at [facility] but this provider has left the practice.  I advised that he establish with a new GI provider for evaluation.  He has never been evaluated for gastroparesis.    PLAN:  -decrease Tresiba 13 units QHS  -continue Farxiga 10 mg QDAY  -continue metformin 500-100 mg BID  -check blood sugars twice a day (before breakfast and dinner)  -Lorenzo Liu through [facility]  -refer to GI  -add on TSH to labs from 1/17    Follow-up in 6 months   Uses MyChart.

## 2025-01-17 ENCOUNTER — LAB (OUTPATIENT)
Dept: LAB | Facility: LAB | Age: 75
End: 2025-01-17
Payer: MEDICARE

## 2025-01-17 DIAGNOSIS — R26.89 IMBALANCE: ICD-10-CM

## 2025-01-17 DIAGNOSIS — Z79.4 TYPE 2 DIABETES MELLITUS WITH HYPERGLYCEMIA, WITH LONG-TERM CURRENT USE OF INSULIN: ICD-10-CM

## 2025-01-17 DIAGNOSIS — E11.65 TYPE 2 DIABETES MELLITUS WITH HYPERGLYCEMIA, WITH LONG-TERM CURRENT USE OF INSULIN: ICD-10-CM

## 2025-01-17 LAB
ALBUMIN SERPL BCP-MCNC: 4.2 G/DL (ref 3.4–5)
ALP SERPL-CCNC: 70 U/L (ref 33–136)
ALT SERPL W P-5'-P-CCNC: 10 U/L (ref 10–52)
ANION GAP SERPL CALC-SCNC: 11 MMOL/L (ref 10–20)
AST SERPL W P-5'-P-CCNC: 10 U/L (ref 9–39)
BILIRUB SERPL-MCNC: 0.6 MG/DL (ref 0–1.2)
BUN SERPL-MCNC: 18 MG/DL (ref 6–23)
CALCIUM SERPL-MCNC: 9.3 MG/DL (ref 8.6–10.3)
CHLORIDE SERPL-SCNC: 105 MMOL/L (ref 98–107)
CHOLEST SERPL-MCNC: 144 MG/DL (ref 0–199)
CHOLESTEROL/HDL RATIO: 3.5
CO2 SERPL-SCNC: 26 MMOL/L (ref 21–32)
CREAT SERPL-MCNC: 1.14 MG/DL (ref 0.5–1.3)
CREAT UR-MCNC: 67.8 MG/DL (ref 20–370)
EGFRCR SERPLBLD CKD-EPI 2021: 68 ML/MIN/1.73M*2
EST. AVERAGE GLUCOSE BLD GHB EST-MCNC: 123 MG/DL
GLUCOSE SERPL-MCNC: 120 MG/DL (ref 74–99)
HBA1C MFR BLD: 5.9 %
HDLC SERPL-MCNC: 41.1 MG/DL
LDLC SERPL CALC-MCNC: 79 MG/DL
MICROALBUMIN UR-MCNC: <7 MG/L
MICROALBUMIN/CREAT UR: NORMAL MG/G{CREAT}
NON HDL CHOLESTEROL: 103 MG/DL (ref 0–149)
POTASSIUM SERPL-SCNC: 4.3 MMOL/L (ref 3.5–5.3)
PROT SERPL-MCNC: 6.8 G/DL (ref 6.4–8.2)
SODIUM SERPL-SCNC: 138 MMOL/L (ref 136–145)
TRIGL SERPL-MCNC: 122 MG/DL (ref 0–149)
VIT B12 SERPL-MCNC: 1346 PG/ML (ref 211–911)
VLDL: 24 MG/DL (ref 0–40)

## 2025-01-17 PROCEDURE — 82570 ASSAY OF URINE CREATININE: CPT

## 2025-01-17 PROCEDURE — 82607 VITAMIN B-12: CPT

## 2025-01-17 PROCEDURE — 80053 COMPREHEN METABOLIC PANEL: CPT

## 2025-01-17 PROCEDURE — 84443 ASSAY THYROID STIM HORMONE: CPT

## 2025-01-17 PROCEDURE — 83036 HEMOGLOBIN GLYCOSYLATED A1C: CPT

## 2025-01-17 PROCEDURE — 80061 LIPID PANEL: CPT

## 2025-01-17 PROCEDURE — 82043 UR ALBUMIN QUANTITATIVE: CPT

## 2025-01-20 ENCOUNTER — APPOINTMENT (OUTPATIENT)
Dept: ENDOCRINOLOGY | Facility: CLINIC | Age: 75
End: 2025-01-20
Payer: MEDICARE

## 2025-01-20 VITALS
HEART RATE: 79 BPM | BODY MASS INDEX: 34.56 KG/M2 | WEIGHT: 234 LBS | SYSTOLIC BLOOD PRESSURE: 128 MMHG | DIASTOLIC BLOOD PRESSURE: 75 MMHG

## 2025-01-20 DIAGNOSIS — R53.82 CHRONIC FATIGUE: Primary | ICD-10-CM

## 2025-01-20 DIAGNOSIS — E66.9 OBESITY, UNSPECIFIED: ICD-10-CM

## 2025-01-20 DIAGNOSIS — Z79.4 TYPE 2 DIABETES MELLITUS WITH HYPERGLYCEMIA, WITH LONG-TERM CURRENT USE OF INSULIN: ICD-10-CM

## 2025-01-20 DIAGNOSIS — E11.65 TYPE 2 DIABETES MELLITUS WITH HYPERGLYCEMIA, WITH LONG-TERM CURRENT USE OF INSULIN: ICD-10-CM

## 2025-01-20 DIAGNOSIS — E11.69 TYPE 2 DIABETES MELLITUS WITH OTHER SPECIFIED COMPLICATION: ICD-10-CM

## 2025-01-20 LAB — TSH SERPL-ACNC: 1.99 MIU/L (ref 0.44–3.98)

## 2025-01-20 PROCEDURE — 95251 CONT GLUC MNTR ANALYSIS I&R: CPT | Performed by: STUDENT IN AN ORGANIZED HEALTH CARE EDUCATION/TRAINING PROGRAM

## 2025-01-20 PROCEDURE — 3048F LDL-C <100 MG/DL: CPT | Performed by: STUDENT IN AN ORGANIZED HEALTH CARE EDUCATION/TRAINING PROGRAM

## 2025-01-20 PROCEDURE — 3062F POS MACROALBUMINURIA REV: CPT | Performed by: STUDENT IN AN ORGANIZED HEALTH CARE EDUCATION/TRAINING PROGRAM

## 2025-01-20 PROCEDURE — 3074F SYST BP LT 130 MM HG: CPT | Performed by: STUDENT IN AN ORGANIZED HEALTH CARE EDUCATION/TRAINING PROGRAM

## 2025-01-20 PROCEDURE — G2211 COMPLEX E/M VISIT ADD ON: HCPCS | Performed by: STUDENT IN AN ORGANIZED HEALTH CARE EDUCATION/TRAINING PROGRAM

## 2025-01-20 PROCEDURE — 3078F DIAST BP <80 MM HG: CPT | Performed by: STUDENT IN AN ORGANIZED HEALTH CARE EDUCATION/TRAINING PROGRAM

## 2025-01-20 PROCEDURE — 99215 OFFICE O/P EST HI 40 MIN: CPT | Performed by: STUDENT IN AN ORGANIZED HEALTH CARE EDUCATION/TRAINING PROGRAM

## 2025-01-20 PROCEDURE — 1159F MED LIST DOCD IN RCRD: CPT | Performed by: STUDENT IN AN ORGANIZED HEALTH CARE EDUCATION/TRAINING PROGRAM

## 2025-01-20 PROCEDURE — 3044F HG A1C LEVEL LT 7.0%: CPT | Performed by: STUDENT IN AN ORGANIZED HEALTH CARE EDUCATION/TRAINING PROGRAM

## 2025-01-20 RX ORDER — ROSUVASTATIN CALCIUM 20 MG/1
20 TABLET, COATED ORAL DAILY
Qty: 90 TABLET | Refills: 3 | Status: SHIPPED | OUTPATIENT
Start: 2025-01-20

## 2025-01-20 RX ORDER — PEN NEEDLE, DIABETIC 30 GX3/16"
NEEDLE, DISPOSABLE MISCELLANEOUS
Qty: 100 EACH | Refills: 3 | Status: SHIPPED | OUTPATIENT
Start: 2025-01-20

## 2025-01-20 ASSESSMENT — ENCOUNTER SYMPTOMS
DEPRESSION: 0
OCCASIONAL FEELINGS OF UNSTEADINESS: 0
LOSS OF SENSATION IN FEET: 0

## 2025-02-26 ENCOUNTER — TELEPHONE (OUTPATIENT)
Dept: ENDOCRINOLOGY | Facility: CLINIC | Age: 75
End: 2025-02-26
Payer: MEDICARE

## 2025-02-26 NOTE — TELEPHONE ENCOUNTER
Received faxed request from [facility] on 02/26 for most recent office note.    Note from Jan 2025 faxed to [phone] on 2/26

## 2025-02-28 DIAGNOSIS — Z79.4 TYPE 2 DIABETES MELLITUS WITH HYPERGLYCEMIA, WITH LONG-TERM CURRENT USE OF INSULIN: ICD-10-CM

## 2025-02-28 DIAGNOSIS — E11.65 TYPE 2 DIABETES MELLITUS WITH HYPERGLYCEMIA, WITH LONG-TERM CURRENT USE OF INSULIN: ICD-10-CM

## 2025-02-28 RX ORDER — METFORMIN HYDROCHLORIDE 1000 MG/1
TABLET ORAL
Qty: 270 TABLET | Refills: 3 | Status: SHIPPED | OUTPATIENT
Start: 2025-02-28

## 2025-03-04 DIAGNOSIS — Z79.4 TYPE 2 DIABETES MELLITUS WITH HYPERGLYCEMIA, WITH LONG-TERM CURRENT USE OF INSULIN: ICD-10-CM

## 2025-03-04 DIAGNOSIS — E11.65 TYPE 2 DIABETES MELLITUS WITH HYPERGLYCEMIA, WITH LONG-TERM CURRENT USE OF INSULIN: ICD-10-CM

## 2025-03-04 RX ORDER — METFORMIN HYDROCHLORIDE 1000 MG/1
TABLET ORAL
Qty: 90 TABLET | Refills: 11 | Status: SHIPPED | OUTPATIENT
Start: 2025-03-04

## 2025-03-04 NOTE — TELEPHONE ENCOUNTER
Per fax from insurance plan will only pay for a 30 day supply of metformin.     Chantel Cornejo RN

## 2025-03-13 ENCOUNTER — APPOINTMENT (OUTPATIENT)
Dept: OPHTHALMOLOGY | Facility: CLINIC | Age: 75
End: 2025-03-13
Payer: MEDICARE

## 2025-03-13 DIAGNOSIS — H52.203 HYPEROPIA OF BOTH EYES WITH ASTIGMATISM AND PRESBYOPIA: ICD-10-CM

## 2025-03-13 DIAGNOSIS — E11.9 DIABETES MELLITUS TYPE 2 WITHOUT RETINOPATHY (MULTI): ICD-10-CM

## 2025-03-13 DIAGNOSIS — H25.813 COMBINED FORMS OF AGE-RELATED CATARACT OF BOTH EYES: ICD-10-CM

## 2025-03-13 DIAGNOSIS — H52.03 HYPEROPIA OF BOTH EYES WITH ASTIGMATISM AND PRESBYOPIA: ICD-10-CM

## 2025-03-13 DIAGNOSIS — H43.822 VITREOMACULAR TRACTION SYNDROME OF LEFT EYE: Primary | ICD-10-CM

## 2025-03-13 DIAGNOSIS — H52.4 HYPEROPIA OF BOTH EYES WITH ASTIGMATISM AND PRESBYOPIA: ICD-10-CM

## 2025-03-13 PROCEDURE — 2023F DILAT RTA XM W/O RTNOPTHY: CPT | Performed by: OPTOMETRIST

## 2025-03-13 PROCEDURE — 92015 DETERMINE REFRACTIVE STATE: CPT | Performed by: OPTOMETRIST

## 2025-03-13 PROCEDURE — 92134 CPTRZ OPH DX IMG PST SGM RTA: CPT | Performed by: OPTOMETRIST

## 2025-03-13 PROCEDURE — 92014 COMPRE OPH EXAM EST PT 1/>: CPT | Performed by: OPTOMETRIST

## 2025-03-13 ASSESSMENT — SLIT LAMP EXAM - LIDS
COMMENTS: GOOD POSITION
COMMENTS: GOOD POSITION

## 2025-03-13 ASSESSMENT — EXTERNAL EXAM - LEFT EYE: OS_EXAM: NORMAL

## 2025-03-13 ASSESSMENT — REFRACTION_MANIFEST
OD_CYLINDER: -0.25
OD_AXIS: 110
OD_ADD: +2.50
OD_SPHERE: +0.75
OS_ADD: +2.50
OS_SPHERE: +2.25
OS_AXIS: 095
OS_CYLINDER: -0.75

## 2025-03-13 ASSESSMENT — CONF VISUAL FIELD
OD_INFERIOR_TEMPORAL_RESTRICTION: 0
OS_SUPERIOR_TEMPORAL_RESTRICTION: 0
OD_SUPERIOR_NASAL_RESTRICTION: 0
OD_SUPERIOR_TEMPORAL_RESTRICTION: 0
OS_SUPERIOR_NASAL_RESTRICTION: 0
OS_INFERIOR_TEMPORAL_RESTRICTION: 0
OS_NORMAL: 1
OD_INFERIOR_NASAL_RESTRICTION: 0
OD_NORMAL: 1
OS_INFERIOR_NASAL_RESTRICTION: 0

## 2025-03-13 ASSESSMENT — VISUAL ACUITY
OS_BAT_MED: 20/20
CORRECTION_TYPE: GLASSES
OD_BAT_MED: 20/25
OD_CC: 20/50
OS_CC: 20/25
OD_PH_CC: 20/40
METHOD: SNELLEN - LINEAR

## 2025-03-13 ASSESSMENT — REFRACTION_WEARINGRX
OS_CYLINDER: -0.75
OS_AXIS: 105
OS_SPHERE: +2.00
OS_ADD: +2.50
OD_SPHERE: +1.25
OD_AXIS: 055
OD_ADD: +2.50
OD_CYLINDER: -1.50

## 2025-03-13 ASSESSMENT — ENCOUNTER SYMPTOMS
CONSTITUTIONAL NEGATIVE: 0
EYES NEGATIVE: 1
RESPIRATORY NEGATIVE: 0
GASTROINTESTINAL NEGATIVE: 0
ALLERGIC/IMMUNOLOGIC NEGATIVE: 0
NEUROLOGICAL NEGATIVE: 0
ENDOCRINE NEGATIVE: 0
HEMATOLOGIC/LYMPHATIC NEGATIVE: 0
PSYCHIATRIC NEGATIVE: 0
MUSCULOSKELETAL NEGATIVE: 0
CARDIOVASCULAR NEGATIVE: 0

## 2025-03-13 ASSESSMENT — TONOMETRY
OD_IOP_MMHG: 14
IOP_METHOD: GOLDMANN APPLANATION
OS_IOP_MMHG: 14

## 2025-03-13 ASSESSMENT — CUP TO DISC RATIO
OD_RATIO: .2
OS_RATIO: .2

## 2025-03-13 ASSESSMENT — EXTERNAL EXAM - RIGHT EYE: OD_EXAM: NORMAL

## 2025-03-13 NOTE — PROGRESS NOTES
Mild cataracts.  VA corrects to 20/20 20/20 OD/OS.  bat 20/25 20/20 od/os.     Recent onset floaters (x2 months).  PVD OD and no tear hole RD.  Hx of retinal hemorrhage many years ago.  No residual findings.      DMII and no retinopathy.  I discussed the value of good blood sugar control and annual eye exams.      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      DMII nd reduced VA OD and Pvd od. Optical coherence tomography of the macula revealed:    OD: Normal foveal contour, photoreceptor, retinal pigment epithelium, IS/OS junction, central field 314 was 320 was 316 micron.  Vitreous hyaloid NOT base visualized.  Findings are c/w PVD.   OS:  Slightly flattened foveal contour from mild VMTS affecting  foveal contour, photoreceptor, retinal pigment epithelium, IS/OS junction, central field 312 was 314 was 314 micron.  Vitreous hyaloid base visualized.  Findings are c/w mild VMTS.  The patient was asked   to return to our clinic or seek out eye care ASAP if new flashes of light or floaters are noted.     Rx change OD and patient advised of refractive VA and glare impact of cataracts..  A spectacle prescription was dispensed to be used as needed.      RTC 1 year for full exam VA manifest refraction (MR) BAT DFE and macula OCT.

## 2025-03-20 DIAGNOSIS — E11.65 TYPE 2 DIABETES MELLITUS WITH HYPERGLYCEMIA, WITH LONG-TERM CURRENT USE OF INSULIN: ICD-10-CM

## 2025-03-20 DIAGNOSIS — Z79.4 TYPE 2 DIABETES MELLITUS WITH HYPERGLYCEMIA, WITH LONG-TERM CURRENT USE OF INSULIN: ICD-10-CM

## 2025-03-20 RX ORDER — METFORMIN HYDROCHLORIDE 1000 MG/1
TABLET ORAL
Qty: 75 TABLET | Refills: 11 | Status: SHIPPED | OUTPATIENT
Start: 2025-03-20

## 2025-04-10 ENCOUNTER — TELEPHONE (OUTPATIENT)
Dept: SCHEDULING | Age: 75
End: 2025-04-10
Payer: MEDICARE

## 2025-04-22 ENCOUNTER — OFFICE VISIT (OUTPATIENT)
Dept: GASTROENTEROLOGY | Facility: CLINIC | Age: 75
End: 2025-04-22
Payer: MEDICARE

## 2025-04-22 VITALS
DIASTOLIC BLOOD PRESSURE: 84 MMHG | BODY MASS INDEX: 33.03 KG/M2 | SYSTOLIC BLOOD PRESSURE: 135 MMHG | TEMPERATURE: 97.5 F | HEIGHT: 69 IN | WEIGHT: 223 LBS | HEART RATE: 69 BPM

## 2025-04-22 DIAGNOSIS — K59.9 MALDIGESTION SYNDROME: Primary | ICD-10-CM

## 2025-04-22 PROCEDURE — 1159F MED LIST DOCD IN RCRD: CPT | Performed by: INTERNAL MEDICINE

## 2025-04-22 PROCEDURE — 3062F POS MACROALBUMINURIA REV: CPT | Performed by: INTERNAL MEDICINE

## 2025-04-22 PROCEDURE — 3075F SYST BP GE 130 - 139MM HG: CPT | Performed by: INTERNAL MEDICINE

## 2025-04-22 PROCEDURE — 1160F RVW MEDS BY RX/DR IN RCRD: CPT | Performed by: INTERNAL MEDICINE

## 2025-04-22 PROCEDURE — 3079F DIAST BP 80-89 MM HG: CPT | Performed by: INTERNAL MEDICINE

## 2025-04-22 PROCEDURE — 3048F LDL-C <100 MG/DL: CPT | Performed by: INTERNAL MEDICINE

## 2025-04-22 PROCEDURE — 99212 OFFICE O/P EST SF 10 MIN: CPT | Performed by: INTERNAL MEDICINE

## 2025-04-22 PROCEDURE — 3044F HG A1C LEVEL LT 7.0%: CPT | Performed by: INTERNAL MEDICINE

## 2025-04-22 PROCEDURE — 3008F BODY MASS INDEX DOCD: CPT | Performed by: INTERNAL MEDICINE

## 2025-04-22 NOTE — PROGRESS NOTES
Subjective   Patient ID: rFandy Nick is a 74 y.o. male.    Here in follow up to colonoscopy last year    Recent glucose intolerance along with maldigestion prompted this visit   He had pancreatitis related to Ozempic about 3 years ago; no GLRA since   Stools more loose recently  Abdomen has been gurgling for decades   Last EGD 2021 with us at   Discussed risk of pancreatic cancer           Review of Systems    Objective   Physical Exam  Constitutional:       Appearance: Normal appearance. He is obese.   Abdominal:      General: There is distension.      Palpations: Abdomen is soft.   Neurological:      Mental Status: He is alert.   Psychiatric:         Mood and Affect: Mood normal.         Behavior: Behavior normal.         Thought Content: Thought content normal.         Judgment: Judgment normal.         Assessment/Plan   Diagnoses and all orders for this visit:  Maldigestion syndrome  -     Pancreatic Elastase, Fecal; Future  -     CT abdomen pelvis w IV contrast; Future  -     Creatinine, Serum; Future  -     CT abdomen pelvis w IV contrast; Future    Please follow up with our team to get the stool testing and CT scan done     Raul Morales, DO

## 2025-04-23 LAB
CREAT SERPL-MCNC: 0.91 MG/DL (ref 0.7–1.28)
EGFRCR SERPLBLD CKD-EPI 2021: 90 ML/MIN/1.73M2

## 2025-04-30 ENCOUNTER — HOSPITAL ENCOUNTER (OUTPATIENT)
Dept: RADIOLOGY | Facility: HOSPITAL | Age: 75
Discharge: HOME | End: 2025-04-30
Payer: MEDICARE

## 2025-04-30 DIAGNOSIS — K59.9 MALDIGESTION SYNDROME: ICD-10-CM

## 2025-04-30 PROCEDURE — 2550000001 HC RX 255 CONTRASTS: Mod: JZ | Performed by: INTERNAL MEDICINE

## 2025-04-30 PROCEDURE — 74177 CT ABD & PELVIS W/CONTRAST: CPT

## 2025-04-30 RX ADMIN — IOHEXOL 100 ML: 350 INJECTION, SOLUTION INTRAVENOUS at 09:00

## 2025-04-30 ASSESSMENT — DERMATOLOGY QUALITY OF LIFE (QOL) ASSESSMENT
RATE HOW EMOTIONALLY BOTHERED YOU ARE BY YOUR SKIN PROBLEM (FOR EXAMPLE, WORRY, EMBARRASSMENT, FRUSTRATION): 1
WHAT SINGLE SKIN CONDITION LISTED BELOW IS THE PATIENT ANSWERING THE QUALITY-OF-LIFE ASSESSMENT QUESTIONS ABOUT: NONE OF THE ABOVE
DATE THE QUALITY-OF-LIFE ASSESSMENT WAS COMPLETED: 67332
RATE HOW BOTHERED YOU ARE BY EFFECTS OF YOUR SKIN PROBLEMS ON YOUR ACTIVITIES (EG, GOING OUT, ACCOMPLISHING WHAT YOU WANT, WORK ACTIVITIES OR YOUR RELATIONSHIPS WITH OTHERS): 0 - NEVER BOTHERED
ARE THERE EXCLUSIONS OR EXCEPTIONS FOR THE QUALITY OF LIFE ASSESSMENT: NO
RATE HOW BOTHERED YOU ARE BY SYMPTOMS OF YOUR SKIN PROBLEM (EG, ITCHING, STINGING BURNING, HURTING OR SKIN IRRITATION): 1
RATE HOW EMOTIONALLY BOTHERED YOU ARE BY YOUR SKIN PROBLEM (FOR EXAMPLE, WORRY, EMBARRASSMENT, FRUSTRATION): 1
RATE HOW BOTHERED YOU ARE BY SYMPTOMS OF YOUR SKIN PROBLEM (EG, ITCHING, STINGING BURNING, HURTING OR SKIN IRRITATION): 1
WHAT SINGLE SKIN CONDITION LISTED BELOW IS THE PATIENT ANSWERING THE QUALITY-OF-LIFE ASSESSMENT QUESTIONS ABOUT: NONE OF THE ABOVE
RATE HOW BOTHERED YOU ARE BY EFFECTS OF YOUR SKIN PROBLEMS ON YOUR ACTIVITIES (EG, GOING OUT, ACCOMPLISHING WHAT YOU WANT, WORK ACTIVITIES OR YOUR RELATIONSHIPS WITH OTHERS): 0 - NEVER BOTHERED

## 2025-04-30 ASSESSMENT — PATIENT GLOBAL ASSESSMENT (PGA): WHAT IS THE PGA: PATIENT GLOBAL ASSESSMENT:  2 - MILD

## 2025-05-02 ENCOUNTER — TELEPHONE (OUTPATIENT)
Dept: GASTROENTEROLOGY | Facility: CLINIC | Age: 75
End: 2025-05-02
Payer: MEDICARE

## 2025-05-02 DIAGNOSIS — Q45.3 PANCREATIC DUCTAL ABNORMALITY: Primary | ICD-10-CM

## 2025-05-03 NOTE — TELEPHONE ENCOUNTER
Images reviewed.  MRI ordered to follow up CT findings as suggested by radiologist.  Raul Morales, DO

## 2025-05-05 ENCOUNTER — TELEPHONE (OUTPATIENT)
Dept: ENDOCRINOLOGY | Facility: CLINIC | Age: 75
End: 2025-05-05
Payer: MEDICARE

## 2025-05-05 DIAGNOSIS — Z79.4 TYPE 2 DIABETES MELLITUS WITH HYPERGLYCEMIA, WITH LONG-TERM CURRENT USE OF INSULIN: ICD-10-CM

## 2025-05-05 DIAGNOSIS — E11.65 TYPE 2 DIABETES MELLITUS WITH HYPERGLYCEMIA, WITH LONG-TERM CURRENT USE OF INSULIN: ICD-10-CM

## 2025-05-05 LAB — ELASTASE PANC STL-MCNT: >800 MCG/G

## 2025-05-05 RX ORDER — INSULIN DEGLUDEC 100 U/ML
INJECTION, SOLUTION SUBCUTANEOUS
Qty: 15 ML | Refills: 1 | Status: SHIPPED | OUTPATIENT
Start: 2025-05-05

## 2025-05-06 NOTE — PROGRESS NOTES
Subjective     Frandy Nick is a 74 y.o. male who presents for the following: Skin Check (Left clavicle; scalp; Hx of aks; mother hx of skin ca (unsure type)).     LV 10/2024  PMH of actinic keratoses. No history of skin cancer.    Reports lesion on the dorsal right foot present for about 1 year. He this it started because his gold shoe was rubbing on it. He has changed shoes and now it comes and goes. Denies itch or pain.     Review of Systems:  No other skin or systemic complaints other than what is documented elsewhere in the note.    The following portions of the chart were reviewed this encounter and updated as appropriate:       Specialty Problems          Dermatology Problems    Scrotal cyst    Actinic keratosis    Follicular disorder, unspecified    Folliculitis    Verruca     Past Medical History:  Frandy Nick  has a past medical history of Anxiety disorder, unspecified (08/31/2021), Gastric ulcer, unspecified as acute or chronic, without hemorrhage or perforation (08/30/2021), Gastro-esophageal reflux disease without esophagitis (08/31/2021), Migraine, unspecified, not intractable, without status migrainosus (08/31/2021), Obstructive sleep apnea syndrome (11/15/2017), Pure hyperglyceridemia, REM sleep behavior disorder (12/15/2022), Restless legs syndrome (10/06/2022), and Type 2 diabetes mellitus with obesity (Multi) (10/22/2018).    Past Surgical History:  Frandy Nick  has a past surgical history that includes Knee surgery (Left).    Family History:  Patient family history is not on file.    Social History:  Frandy Nick  reports that he has quit smoking. His smoking use included cigars. He has been exposed to tobacco smoke. He has never used smokeless tobacco. He reports current alcohol use of about 2.0 standard drinks of alcohol per week. He reports that he does not use drugs.    Allergies:  Dulaglutide, Prednisone, Semaglutide, and Venlafaxine    Current Medications / CAM's:  Current  Medications[1]     Objective   Well appearing patient in no apparent distress; mood and affect are within normal limits.    A full examination was performed including scalp, head, eyes, ears, nose, lips, neck, chest, axillae, abdomen, back, buttocks, bilateral upper extremities, bilateral lower extremities, hands, feet, fingers, toes, fingernails, and toenails. All findings within normal limits unless otherwise noted below.    Generalized, Scalp (4)  Erythematous macules with gritty scale.  Stuck on verrucous, tan-brown papules and plaques.    Well demarcated symmetrical brown to skin colored papules    Right Dorsum of Foot  erythematous thin papule with collarette of scale         Assessment/Plan   ACTINIC KERATOSIS (5)  Scalp (4)  Actinic keratoses on the scalp  - Reviewed the precancerous nature of this lesion and if left on treated may progress to become a squamous cell carcinoma which is a type of non-melanoma skin cancer  - Recommended treatment with cryotherapy today  - Reviewed side effects including dyspigmentation, scarring. Discussed that a blister or scab may form at site of treatment   - Patient provided verbal consent for cryotherapy today  - See procedure note for further details  - Wound care instructions reviewed     Destr of lesion - Generalized, Scalp (4)  Complexity: simple    Destruction method: cryotherapy    Informed consent: discussed and consent obtained    Lesion destroyed using liquid nitrogen: Yes    Region frozen until ice ball extended beyond lesion: Yes    Cryotherapy cycles:  1  Outcome: patient tolerated procedure well with no complications    Post-procedure details: wound care instructions given    Related Procedures  Follow Up In Dermatology - Established Patient  SEBORRHEIC KERATOSIS  Generalized  Seborrheic keratoses  - Reassured patient of the benign nature of these lesions and that no further treatment is needed    MULTIPLE BENIGN NEVI  Generalized  Benign melanocytic nevi  -  Reassured patient of benign nature of these lesions  - These lesions did not meet threshold for biopsy today  - Recommended use of 30+ SPF sunscreen daily and to reapply every 1-2 hours whiles outdoors    BENIGN NEOPLASM  Right Dorsum of Foot  Favor callus on the dorsal right foot from shoes  - Denies symptoms of pain or bleeding. Lesion comes and goes. Present for 1 year  - Discussed option for monitoring or biopsy today. Patient would like to monitor at this time  - Photo obtained today  - Consider biopsy at follow up if changing. Advised to notify us if the lesion grows or becomes symptomatic.       Follow up in 1 year for skin check or sooner if changing lesions    Yadira Kaur MD   PGY-5 Dermatology Resident    I saw and evaluated the patient, participating in the key elements of the service.  I discussed the findings, assessment and plan with the resident and agree with resident’s findings and plan as documented in the resident's note.  I was immediately available for the entirety of the procedure(s) and present for the key and critical portions.     Gabino Forte MD PhD           [1]   Current Outpatient Medications:     albuterol 90 mcg/actuation inhaler, Inhale 2 puffs every 6 hours if needed., Disp: , Rfl:     azelastine (Astelin) 137 mcg (0.1 %) nasal spray, Administer 1 spray into affected nostril(s) twice a day., Disp: , Rfl:     clonazePAM (KlonoPIN) 2 mg tablet, Take 1 tablet (2 mg) by mouth 2 times a day as needed. (Patient taking differently: Take 1 tablet (2 mg) by mouth once daily at bedtime.), Disp: , Rfl:     clopidogrel (Plavix) 75 mg tablet, Take 1 tablet (75 mg) by mouth once daily., Disp: 90 tablet, Rfl: 3    esomeprazole (NexIUM) 20 mg DR capsule, Take 1 capsule (20 mg) by mouth once daily in the morning. Take before meals., Disp: , Rfl:     Farxiga 10 mg, TAKE 1 TABLET (10 MG) BY MOUTH ONCE DAILY IN THE MORNING. TAKE BEFORE MEALS., Disp: 90 tablet, Rfl: 1    FreeStyle Lorenzo reader  "(FreeStyle Lorenzo 2 [place]) misc, Inject 1 each under the skin if needed. Use as instructed, Disp: , Rfl:     insulin degludec (Tresiba FlexTouch U-100) 100 unit/mL (3 mL) pen, INJECT 15 UNITS SUBCUTANEOUSLY AT BEDTIME, Disp: 15 mL, Rfl: 1    metFORMIN (Glucophage) 1,000 mg tablet, TAKE 1 TABLET IN THE MORNING AND 2 TABLETS IN THE EVENING, Disp: 75 tablet, Rfl: 11    pen needle, diabetic (BD Ultra-Fine Mini Pen Needle) 31 gauge x 3/16\" needle, Use with insulin nightly., Disp: 100 each, Rfl: 3    pramipexole (Mirapex) 0.125 mg tablet, Take 1 tablet (0.125 mg) by mouth 3 times a day., Disp: , Rfl:     rosuvastatin (Crestor) 20 mg tablet, Take 1 tablet (20 mg) by mouth once daily., Disp: 90 tablet, Rfl: 3    tadalafil (Cialis) 5 mg tablet, Take 1 tablet (5 mg) by mouth once daily., Disp: 90 tablet, Rfl: 3    tadalafil 20 mg tablet, Take 1 tablet (20 mg) by mouth once daily as needed for erectile dysfunction., Disp: 10 tablet, Rfl: 1    "

## 2025-05-07 ENCOUNTER — APPOINTMENT (OUTPATIENT)
Dept: DERMATOLOGY | Facility: CLINIC | Age: 75
End: 2025-05-07
Payer: MEDICARE

## 2025-05-07 DIAGNOSIS — D22.9 MULTIPLE BENIGN NEVI: ICD-10-CM

## 2025-05-07 DIAGNOSIS — L82.1 SEBORRHEIC KERATOSIS: ICD-10-CM

## 2025-05-07 DIAGNOSIS — L57.0 ACTINIC KERATOSIS: ICD-10-CM

## 2025-05-07 DIAGNOSIS — D36.9 BENIGN NEOPLASM: ICD-10-CM

## 2025-05-07 PROCEDURE — 99213 OFFICE O/P EST LOW 20 MIN: CPT | Performed by: DERMATOLOGY

## 2025-05-07 PROCEDURE — 3044F HG A1C LEVEL LT 7.0%: CPT | Performed by: DERMATOLOGY

## 2025-05-07 PROCEDURE — 3062F POS MACROALBUMINURIA REV: CPT | Performed by: DERMATOLOGY

## 2025-05-07 PROCEDURE — 17003 DESTRUCT PREMALG LES 2-14: CPT | Performed by: STUDENT IN AN ORGANIZED HEALTH CARE EDUCATION/TRAINING PROGRAM

## 2025-05-07 PROCEDURE — 1159F MED LIST DOCD IN RCRD: CPT | Performed by: DERMATOLOGY

## 2025-05-07 PROCEDURE — 3048F LDL-C <100 MG/DL: CPT | Performed by: DERMATOLOGY

## 2025-05-07 PROCEDURE — 17000 DESTRUCT PREMALG LESION: CPT | Performed by: STUDENT IN AN ORGANIZED HEALTH CARE EDUCATION/TRAINING PROGRAM

## 2025-05-07 ASSESSMENT — ITCH NUMERIC RATING SCALE: HOW SEVERE IS YOUR ITCHING?: 0

## 2025-05-07 NOTE — Clinical Note
Actinic keratoses  - Reviewed the precancerous nature of this lesion and if left on treated may progress to become a squamous cell carcinoma which is a type of non-melanoma skin cancer  - Recommended treatment with cryotherapy today  - Reviewed side effects including dyspigmentation, scarring. Discussed that a blister or scab may form at site of treatment   - Patient provided verbal consent for cryotherapy today  - See procedure note for further details  - Wound care instructions reviewed

## 2025-05-07 NOTE — Clinical Note
Favor callus on the dorsal right foot from shoes  - Denies symptoms of pain or bleeding. Lesion comes and goes. Present for 1 year  - Discussed option for monitoring or biopsy today. Patient would like to monitor at this time  - Photo obtained today  - Consider biopsy at follow up if changing. Advised to notify us if the lesion grows or becomes symptomatic.

## 2025-05-07 NOTE — Clinical Note
Seborrheic keratoses  - Reassured patient of the benign nature of these lesions and that no further treatment is needed

## 2025-05-07 NOTE — Clinical Note
Actinic keratoses on the scalp  - Reviewed the precancerous nature of this lesion and if left on treated may progress to become a squamous cell carcinoma which is a type of non-melanoma skin cancer  - Recommended treatment with cryotherapy today  - Reviewed side effects including dyspigmentation, scarring. Discussed that a blister or scab may form at site of treatment   - Patient provided verbal consent for cryotherapy today  - See procedure note for further details  - Wound care instructions reviewed

## 2025-05-07 NOTE — Clinical Note
Benign melanocytic nevi  - Reassured patient of benign nature of these lesions  - These lesions did not meet threshold for biopsy today  - Recommended use of 30+ SPF sunscreen daily and to reapply every 1-2 hours whiles outdoors

## 2025-05-12 ENCOUNTER — HOSPITAL ENCOUNTER (OUTPATIENT)
Dept: RADIOLOGY | Facility: HOSPITAL | Age: 75
Discharge: HOME | End: 2025-05-12
Payer: MEDICARE

## 2025-05-12 DIAGNOSIS — Q45.3 PANCREATIC DUCTAL ABNORMALITY: ICD-10-CM

## 2025-05-12 PROCEDURE — 76376 3D RENDER W/INTRP POSTPROCES: CPT

## 2025-05-12 PROCEDURE — 74183 MRI ABD W/O CNTR FLWD CNTR: CPT

## 2025-05-12 PROCEDURE — 2550000001 HC RX 255 CONTRASTS: Performed by: INTERNAL MEDICINE

## 2025-05-12 PROCEDURE — A9575 INJ GADOTERATE MEGLUMI 0.1ML: HCPCS | Performed by: INTERNAL MEDICINE

## 2025-05-12 RX ORDER — GADOTERATE MEGLUMINE 376.9 MG/ML
20 INJECTION INTRAVENOUS
Status: COMPLETED | OUTPATIENT
Start: 2025-05-12 | End: 2025-05-12

## 2025-05-12 RX ADMIN — GADOTERATE MEGLUMINE 19 ML: 376.9 INJECTION INTRAVENOUS at 15:19

## 2025-05-30 ENCOUNTER — TELEPHONE (OUTPATIENT)
Dept: SCHEDULING | Age: 75
End: 2025-05-30
Payer: MEDICARE

## 2025-05-30 NOTE — TELEPHONE ENCOUNTER
"   Pt texted Sun: \"I need to schedule a drug test for my Clonopin prescription which I am starting to run out of. Unfortunately I will be out of town in [place] for a family  and back midday on . I understand there are protocols and this needs to be scheduled. Is there a chance you can find me an appointment Thursday afternoon somewhere on the [place] near my home in [place]. Like near [facility] or [place]. Later the better but in the 1-5 range so I'm sure I am back.Please advise.  "
Per Fatuma Ward MA-  Never mind, he gets it from [facility]-- he should get that from his provider there then.  
house

## 2025-06-02 ENCOUNTER — APPOINTMENT (OUTPATIENT)
Dept: RADIOLOGY | Facility: HOSPITAL | Age: 75
End: 2025-06-02
Payer: MEDICARE

## 2025-07-09 NOTE — PROGRESS NOTES
"FUV for diabetes. LV with me 01/2025.    History of Present Illness  75 y.o. male with hx of type 2 diabetes, obesity class I, HLD.     Dx: in 60s  HbA1c: 6.0% (07/2025), 5.9% (01/2025), 6.0% (6/25/2024), 6.2% (03/2024), 6.2% (11/15/2023), 6.3% (7/5/2023), 6.6% (03/2023), 13.2% (12/2022)  Current regimen: metformin 500-1000 mg BID, Tresiba 13 units QHS, Farxiga 10 mg QDAY  Past medications: Ozempic (pancreatitis), Trulicity, Victoza  Complications: none  Comorbidities: obesity, HLD     SMBG: Lorenzo 2     Hypoglycemia: no    Today:  -doing OK    ROS  General: no fever or chills  CV: no chest pain   Respiratory: no shortness of breath  MSK: no lower extremity edema  Neuro: no headache or dizziness  See HPI for Endocrine ROS    Physical Exam   height is 1.753 m (5' 9\") and weight is 101 kg (222 lb). His blood pressure is 114/71 and his pulse is 64.   General: not in acute distress  HEENT: MOISES HOGUE  Thyroid: no goiter  Neuro: alert and oriented x 3      Current Outpatient Medications   Medication Sig Dispense Refill    albuterol 90 mcg/actuation inhaler Inhale 2 puffs every 6 hours if needed.      ascorbic acid (Vitamin C) 250 mg tablet take 1 nightly with the ferrous sulfate      azelastine (Astelin) 137 mcg (0.1 %) nasal spray Administer 1 spray into affected nostril(s) twice a day.      clonazePAM (KlonoPIN) 2 mg tablet Take 1 tablet (2 mg) by mouth 2 times a day as needed.      clopidogrel (Plavix) 75 mg tablet Take 1 tablet (75 mg) by mouth once daily. 90 tablet 3    esomeprazole (NexIUM) 20 mg DR capsule Take 1 capsule (20 mg) by mouth once daily in the morning. Take before meals.      Farxiga 10 mg tablet TAKE 1 TABLET (10 MG) BY MOUTH ONCE DAILY IN THE MORNING. TAKE BEFORE MEALS. 90 tablet 0    ferrous sulfate 325 mg (65 mg elemental) tablet take 1 nightly with vit C 250mg at the same time to facilitate iron absorption      FreeStyle Lorenzo reader (FreeStyle Lorenzo 2 [place]) misc Inject 1 each under the skin if " "needed. Use as instructed      insulin degludec (Tresiba FlexTouch U-100) 100 unit/mL (3 mL) pen INJECT 15 UNITS SUBCUTANEOUSLY AT BEDTIME (Patient taking differently: Inject 13 Units under the skin once daily at bedtime. INJECT 15 UNITS SUBCUTANEOUSLY AT BEDTIME) 15 mL 1    metFORMIN (Glucophage) 1,000 mg tablet TAKE 1 TABLET IN THE MORNING AND 2 TABLETS IN THE EVENING 75 tablet 11    pen needle, diabetic (BD Ultra-Fine Mini Pen Needle) 31 gauge x 3/16\" needle Use with insulin nightly. 100 each 3    pramipexole (Mirapex) 0.125 mg tablet Take 1 tablet (0.125 mg) by mouth 3 times a day.      rosuvastatin (Crestor) 20 mg tablet Take 1 tablet (20 mg) by mouth once daily. 90 tablet 3    tadalafil (Cialis) 5 mg tablet Take 1 tablet (5 mg) by mouth once daily. 90 tablet 3    tadalafil 20 mg tablet Take 1 tablet (20 mg) by mouth once daily as needed for erectile dysfunction. 10 tablet 1     No current facility-administered medications for this visit.         Assessment and Plan  75 y.o. male with hx of type 2 diabetes, obesity class I, HLD, here for management of diabetes and medication side effects.     1. Type 2 diabetes mellitus  2. Obesity class I  HbA1c: 5.9% (2025), 6.0% (2024), 6.2% (2024), 6.2% (11/15/2023), 6.3% (2023), 6.6% (3/29/2023), 13.2% (2022), 6.7% (2022), 6.3% (2021)  Current regimen: metformin 500-1000 mg BID, Tresiba 13 units QHS, Farxiga 10 mg QDAY  Eye exam: no DR (2025)  Urine microalbumin: neg (2025)  Podiatry: foot exam done 2024  Lipids: HDL 41, LDL 79,  (2025) on rosuvastatin 20 mg  *Avoid GLP-1RA-pancreatitis on Ozempic    A1c: 6.0% today. Stable and remains at goal.  Did not bring Lorenzo reader today (lost charging cable and reader ).  He will call [employer] for a replacement cable.    He reports having BG in the 60s during the afternoon similar to last visit.  Will have him decrease Tresiba slightly again.  He prefers to stay on Tresiba even at a low " dose because in the past when Tresiba was discontinued, he had hyperglycemia requiring an ED visit.     He has chronic GI issues with bloating, diarrhea, abdominal tenderness for years but symptoms had been minimal until the past 3 months again.  He reported worsening fatigue, nausea, bloating since starting Basaglar. Symptoms improved with dose reduction. Reports that Tresiba has caused the least degree of side effects by far and he is tolerating this.    PCP is having him hold rosuvastatin as a trial to see whether this will help his symptoms.  He reports that some of his symptoms have improved.    PLAN:  -decrease Tresiba 11 units QHS  -continue Farxiga 10 mg QDAY  -continue metformin 500-1000 mg BID  -check blood sugars twice a day (before breakfast and dinner)  -Lorenzo 2 through [facility]  -up to date with screening    Follow-up in 5 months with Tiffani Zabala PA-C , then with Dr. Rocha in April 2026  Uses [email or web address].

## 2025-07-11 DIAGNOSIS — Z79.4 TYPE 2 DIABETES MELLITUS WITH HYPERGLYCEMIA, WITH LONG-TERM CURRENT USE OF INSULIN: ICD-10-CM

## 2025-07-11 DIAGNOSIS — E11.65 TYPE 2 DIABETES MELLITUS WITH HYPERGLYCEMIA, WITH LONG-TERM CURRENT USE OF INSULIN: ICD-10-CM

## 2025-07-11 RX ORDER — DAPAGLIFLOZIN 10 MG/1
10 TABLET, FILM COATED ORAL
Qty: 90 TABLET | Refills: 0 | Status: SHIPPED | OUTPATIENT
Start: 2025-07-11

## 2025-07-14 ENCOUNTER — OFFICE VISIT (OUTPATIENT)
Dept: PRIMARY CARE | Facility: HOSPITAL | Age: 75
End: 2025-07-14
Payer: MEDICARE

## 2025-07-14 VITALS
WEIGHT: 227 LBS | DIASTOLIC BLOOD PRESSURE: 64 MMHG | HEART RATE: 82 BPM | OXYGEN SATURATION: 95 % | SYSTOLIC BLOOD PRESSURE: 120 MMHG | RESPIRATION RATE: 16 BRPM | HEIGHT: 69 IN | TEMPERATURE: 96 F | BODY MASS INDEX: 33.62 KG/M2

## 2025-07-14 DIAGNOSIS — Z00.00 HEALTHCARE MAINTENANCE: Primary | ICD-10-CM

## 2025-07-14 PROCEDURE — 1125F AMNT PAIN NOTED PAIN PRSNT: CPT | Performed by: INTERNAL MEDICINE

## 2025-07-14 PROCEDURE — 1170F FXNL STATUS ASSESSED: CPT | Performed by: INTERNAL MEDICINE

## 2025-07-14 PROCEDURE — 3062F POS MACROALBUMINURIA REV: CPT | Performed by: INTERNAL MEDICINE

## 2025-07-14 PROCEDURE — 3048F LDL-C <100 MG/DL: CPT | Performed by: INTERNAL MEDICINE

## 2025-07-14 PROCEDURE — 3044F HG A1C LEVEL LT 7.0%: CPT | Performed by: INTERNAL MEDICINE

## 2025-07-14 PROCEDURE — 1123F ACP DISCUSS/DSCN MKR DOCD: CPT | Performed by: INTERNAL MEDICINE

## 2025-07-14 PROCEDURE — 1159F MED LIST DOCD IN RCRD: CPT | Performed by: INTERNAL MEDICINE

## 2025-07-14 PROCEDURE — 1160F RVW MEDS BY RX/DR IN RCRD: CPT | Performed by: INTERNAL MEDICINE

## 2025-07-14 PROCEDURE — 99215 OFFICE O/P EST HI 40 MIN: CPT | Performed by: INTERNAL MEDICINE

## 2025-07-14 PROCEDURE — 1158F ADVNC CARE PLAN TLK DOCD: CPT | Performed by: INTERNAL MEDICINE

## 2025-07-14 PROCEDURE — 3074F SYST BP LT 130 MM HG: CPT | Performed by: INTERNAL MEDICINE

## 2025-07-14 PROCEDURE — 3078F DIAST BP <80 MM HG: CPT | Performed by: INTERNAL MEDICINE

## 2025-07-14 PROCEDURE — 3008F BODY MASS INDEX DOCD: CPT | Performed by: INTERNAL MEDICINE

## 2025-07-14 PROCEDURE — G0439 PPPS, SUBSEQ VISIT: HCPCS | Performed by: INTERNAL MEDICINE

## 2025-07-14 RX ORDER — FERROUS SULFATE 325(65) MG
TABLET ORAL
COMMUNITY
Start: 2025-07-10

## 2025-07-14 ASSESSMENT — ENCOUNTER SYMPTOMS
SINUS PRESSURE: 0
DIARRHEA: 0
MYALGIAS: 0
AGITATION: 0
WEAKNESS: 0
CHEST TIGHTNESS: 0
NAUSEA: 0
LOSS OF SENSATION IN FEET: 0
PALPITATIONS: 0
DEPRESSION: 0
ACTIVITY CHANGE: 0
CHILLS: 0
SHORTNESS OF BREATH: 0
OCCASIONAL FEELINGS OF UNSTEADINESS: 0

## 2025-07-14 ASSESSMENT — ACTIVITIES OF DAILY LIVING (ADL)
BATHING: INDEPENDENT
GROCERY_SHOPPING: INDEPENDENT
MANAGING_FINANCES: INDEPENDENT
DOING_HOUSEWORK: INDEPENDENT
TAKING_MEDICATION: INDEPENDENT
DRESSING: INDEPENDENT

## 2025-07-14 ASSESSMENT — PAIN SCALES - GENERAL: PAINLEVEL_OUTOF10: 6

## 2025-07-14 NOTE — ASSESSMENT & PLAN NOTE
Considering all the stress, he is still doing fairly well.  We talked about the various dynamics involved.  He has had a number of laboratory test done recently so there is no need to repeat.  His last hemoglobin A1c was excellent.

## 2025-07-14 NOTE — PROGRESS NOTES
"Subjective   Reason for Visit: Frandy Nick is an 75 y.o. male here for a Medicare Wellness visit. Fatigue, poor sleep, and lost weight. He had a pancreatic cancer scare.  But thankfully, it does not look like he has pancreatic cancer.  He will be following with endocrine every 6 months going forward.  He also has a grandchild that was premature and has had a [facility] hospital stay.  His family has been under a lot of stress.    Past Medical, Surgical, and Family History reviewed and updated in chart.    Reviewed all medications by prescribing practitioner or clinical pharmacist (such as prescriptions, OTCs, herbal therapies and supplements) and documented in the medical record.    HPI    Patient Care Team:  Josh Frias MD as PCP - General (Internal Medicine)  Josh Frias MD as PCP - [facility] Attributed Provider     Review of Systems   Constitutional:  Negative for activity change and chills.   HENT:  Negative for congestion and sinus pressure.    Respiratory:  Negative for chest tightness and shortness of breath.    Cardiovascular:  Negative for chest pain and palpitations.   Gastrointestinal:  Negative for diarrhea and nausea.   Musculoskeletal:  Negative for myalgias.   Neurological:  Negative for weakness.   Psychiatric/Behavioral:  Negative for agitation and behavioral problems.        Objective   Vitals:  /64   Pulse 82   Temp 35.6 °C (96 °F) (Temporal)   Resp 16   Ht 1.753 m (5' 9\")   Wt 103 kg (227 lb)   SpO2 95%   BMI 33.52 kg/m²       Physical Exam  Constitutional:       Appearance: Normal appearance.   HENT:      Head: Normocephalic and atraumatic.      Nose: Nose normal.      Mouth/Throat:      Mouth: Mucous membranes are moist.   Eyes:      Extraocular Movements: Extraocular movements intact.   Cardiovascular:      Rate and Rhythm: Normal rate and regular rhythm.   Pulmonary:      Effort: No respiratory distress.      Breath sounds: No wheezing.   Abdominal:      General: Bowel sounds " are normal.      Palpations: Abdomen is soft.   Neurological:      General: No focal deficit present.       Assessment & Plan  Healthcare maintenance  Considering all the stress, he is still doing fairly well.  We talked about the various dynamics involved.  He has had a number of laboratory test done recently so there is no need to repeat.  His last hemoglobin A1c was excellent.

## 2025-07-21 ENCOUNTER — APPOINTMENT (OUTPATIENT)
Dept: ENDOCRINOLOGY | Facility: CLINIC | Age: 75
End: 2025-07-21
Payer: MEDICARE

## 2025-07-21 VITALS
HEART RATE: 64 BPM | WEIGHT: 222 LBS | DIASTOLIC BLOOD PRESSURE: 71 MMHG | HEIGHT: 69 IN | BODY MASS INDEX: 32.88 KG/M2 | SYSTOLIC BLOOD PRESSURE: 114 MMHG

## 2025-07-21 DIAGNOSIS — Z79.4 TYPE 2 DIABETES MELLITUS WITH HYPERGLYCEMIA, WITH LONG-TERM CURRENT USE OF INSULIN: ICD-10-CM

## 2025-07-21 DIAGNOSIS — E11.65 TYPE 2 DIABETES MELLITUS WITH HYPERGLYCEMIA, WITH LONG-TERM CURRENT USE OF INSULIN: ICD-10-CM

## 2025-07-21 DIAGNOSIS — E11.9 DIABETES MELLITUS TYPE 2 WITHOUT RETINOPATHY (MULTI): ICD-10-CM

## 2025-07-21 LAB — POC HEMOGLOBIN A1C: 6 % (ref 4.2–6.5)

## 2025-07-21 PROCEDURE — 3078F DIAST BP <80 MM HG: CPT | Performed by: STUDENT IN AN ORGANIZED HEALTH CARE EDUCATION/TRAINING PROGRAM

## 2025-07-21 PROCEDURE — 3008F BODY MASS INDEX DOCD: CPT | Performed by: STUDENT IN AN ORGANIZED HEALTH CARE EDUCATION/TRAINING PROGRAM

## 2025-07-21 PROCEDURE — 1159F MED LIST DOCD IN RCRD: CPT | Performed by: STUDENT IN AN ORGANIZED HEALTH CARE EDUCATION/TRAINING PROGRAM

## 2025-07-21 PROCEDURE — 99214 OFFICE O/P EST MOD 30 MIN: CPT | Performed by: STUDENT IN AN ORGANIZED HEALTH CARE EDUCATION/TRAINING PROGRAM

## 2025-07-21 PROCEDURE — 3074F SYST BP LT 130 MM HG: CPT | Performed by: STUDENT IN AN ORGANIZED HEALTH CARE EDUCATION/TRAINING PROGRAM

## 2025-07-21 PROCEDURE — 83036 HEMOGLOBIN GLYCOSYLATED A1C: CPT | Performed by: STUDENT IN AN ORGANIZED HEALTH CARE EDUCATION/TRAINING PROGRAM

## 2025-07-21 PROCEDURE — 3044F HG A1C LEVEL LT 7.0%: CPT | Performed by: STUDENT IN AN ORGANIZED HEALTH CARE EDUCATION/TRAINING PROGRAM

## 2025-07-21 PROCEDURE — G2211 COMPLEX E/M VISIT ADD ON: HCPCS | Performed by: STUDENT IN AN ORGANIZED HEALTH CARE EDUCATION/TRAINING PROGRAM

## 2025-07-21 RX ORDER — DAPAGLIFLOZIN 10 MG/1
10 TABLET, FILM COATED ORAL
Qty: 90 TABLET | Refills: 3 | Status: SHIPPED | OUTPATIENT
Start: 2025-07-21

## 2025-07-21 RX ORDER — ASCORBIC ACID 250 MG
TABLET ORAL
COMMUNITY
Start: 2025-07-10

## 2025-07-21 ASSESSMENT — PATIENT HEALTH QUESTIONNAIRE - PHQ9
1. LITTLE INTEREST OR PLEASURE IN DOING THINGS: NOT AT ALL
2. FEELING DOWN, DEPRESSED OR HOPELESS: NOT AT ALL
SUM OF ALL RESPONSES TO PHQ9 QUESTIONS 1 & 2: 0

## 2025-07-21 ASSESSMENT — ENCOUNTER SYMPTOMS
OCCASIONAL FEELINGS OF UNSTEADINESS: 0
LOSS OF SENSATION IN FEET: 0
DEPRESSION: 0

## 2025-08-01 DIAGNOSIS — N13.8 BPH WITH OBSTRUCTION/LOWER URINARY TRACT SYMPTOMS: ICD-10-CM

## 2025-08-01 DIAGNOSIS — N40.1 BPH WITH OBSTRUCTION/LOWER URINARY TRACT SYMPTOMS: ICD-10-CM

## 2025-08-01 RX ORDER — TADALAFIL 5 MG/1
5 TABLET ORAL DAILY
Qty: 90 TABLET | Refills: 0 | Status: SHIPPED | OUTPATIENT
Start: 2025-08-01 | End: 2025-10-30

## 2025-08-01 NOTE — TELEPHONE ENCOUNTER
Pt LVM on nurse line stating he needed refills of tadalafil 5mg sent to [facility].    Last refill 10/4/24  Last appt 11/14/24  Next appt 10/14/25

## 2025-08-02 DIAGNOSIS — E78.5 HYPERLIPIDEMIA LDL GOAL <100: Primary | ICD-10-CM

## 2025-08-05 ENCOUNTER — OFFICE VISIT (OUTPATIENT)
Dept: GASTROENTEROLOGY | Facility: CLINIC | Age: 75
End: 2025-08-05
Payer: MEDICARE

## 2025-08-05 VITALS
TEMPERATURE: 97.2 F | DIASTOLIC BLOOD PRESSURE: 81 MMHG | BODY MASS INDEX: 33.18 KG/M2 | WEIGHT: 224 LBS | SYSTOLIC BLOOD PRESSURE: 116 MMHG | HEART RATE: 80 BPM | HEIGHT: 69 IN

## 2025-08-05 DIAGNOSIS — K86.89 PANCREATIC DUCT STRICTURE (HHS-HCC): Primary | ICD-10-CM

## 2025-08-05 PROCEDURE — 99212 OFFICE O/P EST SF 10 MIN: CPT | Performed by: INTERNAL MEDICINE

## 2025-08-05 PROCEDURE — 3044F HG A1C LEVEL LT 7.0%: CPT | Performed by: INTERNAL MEDICINE

## 2025-08-05 PROCEDURE — 3079F DIAST BP 80-89 MM HG: CPT | Performed by: INTERNAL MEDICINE

## 2025-08-05 PROCEDURE — 3074F SYST BP LT 130 MM HG: CPT | Performed by: INTERNAL MEDICINE

## 2025-08-05 PROCEDURE — 99212 OFFICE O/P EST SF 10 MIN: CPT

## 2025-08-05 PROCEDURE — 1159F MED LIST DOCD IN RCRD: CPT | Performed by: INTERNAL MEDICINE

## 2025-08-05 PROCEDURE — 3008F BODY MASS INDEX DOCD: CPT | Performed by: INTERNAL MEDICINE

## 2025-08-05 NOTE — PROGRESS NOTES
Subjective   Patient ID: Frandy Nick is a 75 y.o. male.  Previously seen for colon cancer screening and found nonadvanced adenomas.  A CT was done due to worsening DM and maldigestion.  EPI excluded.      Here in follow to an incidental finding of an obstructed PD in the tail of the gland    Nausea, fatigue and anorexia better after stopping Crestor per oJsh Frias MD    Endocrinology provider recently departed  and will follow up with an endocrine GEORGE    Back discomfort due to prolonged golfing recently            Review of Systems    Objective   Physical Exam  Constitutional:       Appearance: Normal appearance.     Neurological:      Mental Status: He is alert.     Psychiatric:         Mood and Affect: Mood normal.         Behavior: Behavior normal.         Thought Content: Thought content normal.         Judgment: Judgment normal.         Assessment/Plan   Diagnoses and all orders for this visit:  Pancreatic duct stricture (HHS-HCC)  -     MR abdomen w and wo IV contrast; Future    We reviewed your last MRI and agreed that a repeat MRI in November to check the area of the pancreatic tail is recommended.    Raul Morales, DO

## 2025-10-02 ENCOUNTER — APPOINTMENT (OUTPATIENT)
Dept: ENDOCRINOLOGY | Facility: CLINIC | Age: 75
End: 2025-10-02
Payer: MEDICARE

## 2025-12-03 ENCOUNTER — APPOINTMENT (OUTPATIENT)
Dept: ENDOCRINOLOGY | Facility: CLINIC | Age: 75
End: 2025-12-03
Payer: MEDICARE

## 2026-03-25 ENCOUNTER — APPOINTMENT (OUTPATIENT)
Dept: OPHTHALMOLOGY | Facility: CLINIC | Age: 76
End: 2026-03-25
Payer: MEDICARE

## 2026-04-16 ENCOUNTER — APPOINTMENT (OUTPATIENT)
Dept: ENDOCRINOLOGY | Facility: CLINIC | Age: 76
End: 2026-04-16
Payer: MEDICARE

## 2026-05-06 ENCOUNTER — APPOINTMENT (OUTPATIENT)
Dept: DERMATOLOGY | Facility: CLINIC | Age: 76
End: 2026-05-06
Payer: MEDICARE